# Patient Record
Sex: FEMALE | Race: WHITE | Employment: FULL TIME | URBAN - METROPOLITAN AREA
[De-identification: names, ages, dates, MRNs, and addresses within clinical notes are randomized per-mention and may not be internally consistent; named-entity substitution may affect disease eponyms.]

---

## 2017-02-01 ENCOUNTER — APPOINTMENT (OUTPATIENT)
Dept: LAB | Age: 37
End: 2017-02-01
Attending: NURSE PRACTITIONER
Payer: COMMERCIAL

## 2017-02-01 DIAGNOSIS — R79.89 ELEVATED SERUM CREATININE: ICD-10-CM

## 2017-02-01 DIAGNOSIS — F31.9 BIPOLAR DISORDER, UNSPECIFIED (HCC): ICD-10-CM

## 2017-02-01 DIAGNOSIS — F41.9 ANXIETY: ICD-10-CM

## 2017-02-01 DIAGNOSIS — F43.10 POSTTRAUMATIC STRESS DISORDER: ICD-10-CM

## 2017-02-01 DIAGNOSIS — F98.8 ADD (ATTENTION DEFICIT DISORDER): ICD-10-CM

## 2017-02-01 LAB
BUN BLD-MCNC: 11 MG/DL (ref 8–20)
CHOLEST SMN-MCNC: 174 MG/DL (ref ?–200)
CREAT BLD-MCNC: 1 MG/DL (ref 0.55–1.02)
GLUCOSE BLD-MCNC: 95 MG/DL (ref 70–99)
HDLC SERPL-MCNC: 46 MG/DL (ref 45–?)
HDLC SERPL: 3.78 {RATIO} (ref ?–4.44)
LDLC SERPL CALC-MCNC: 111 MG/DL (ref ?–130)
NONHDLC SERPL-MCNC: 128 MG/DL (ref ?–130)
TRIGLYCERIDES: 83 MG/DL (ref ?–150)
VLDL: 17 MG/DL (ref 5–40)

## 2017-02-01 PROCEDURE — 80061 LIPID PANEL: CPT

## 2017-02-01 PROCEDURE — 84520 ASSAY OF UREA NITROGEN: CPT

## 2017-02-01 PROCEDURE — 82565 ASSAY OF CREATININE: CPT

## 2017-02-01 PROCEDURE — 82947 ASSAY GLUCOSE BLOOD QUANT: CPT

## 2017-02-01 PROCEDURE — 36415 COLL VENOUS BLD VENIPUNCTURE: CPT

## 2017-06-02 PROCEDURE — 81015 MICROSCOPIC EXAM OF URINE: CPT | Performed by: UROLOGY

## 2017-06-02 PROCEDURE — 87086 URINE CULTURE/COLONY COUNT: CPT | Performed by: UROLOGY

## 2017-06-07 PROCEDURE — 82607 VITAMIN B-12: CPT | Performed by: INTERNAL MEDICINE

## 2017-06-07 PROCEDURE — 86665 EPSTEIN-BARR CAPSID VCA: CPT | Performed by: INTERNAL MEDICINE

## 2017-06-07 PROCEDURE — 82746 ASSAY OF FOLIC ACID SERUM: CPT | Performed by: INTERNAL MEDICINE

## 2017-06-07 PROCEDURE — 86141 C-REACTIVE PROTEIN HS: CPT | Performed by: INTERNAL MEDICINE

## 2017-06-07 PROCEDURE — 86644 CMV ANTIBODY: CPT | Performed by: INTERNAL MEDICINE

## 2017-06-07 PROCEDURE — 86645 CMV ANTIBODY IGM: CPT | Performed by: INTERNAL MEDICINE

## 2017-07-20 PROCEDURE — 81001 URINALYSIS AUTO W/SCOPE: CPT | Performed by: INTERNAL MEDICINE

## 2017-11-10 DIAGNOSIS — Z30.09 GENERAL COUNSELING FOR PRESCRIPTION OF ORAL CONTRACEPTIVES: ICD-10-CM

## 2017-11-10 RX ORDER — LEVONORGESTREL AND ETHINYL ESTRADIOL AND ETHINYL ESTRADIOL 150-30(84)
1 KIT ORAL DAILY
Qty: 91 TABLET | Refills: 0 | OUTPATIENT
Start: 2017-11-10 | End: 2018-02-09

## 2017-11-28 ENCOUNTER — TELEPHONE (OUTPATIENT)
Dept: OBGYN CLINIC | Facility: CLINIC | Age: 37
End: 2017-11-28

## 2017-11-28 NOTE — TELEPHONE ENCOUNTER
Received faxed refill request for Amethia tablets 91S. Patient last seen 2/2016; due for annual. Please contact her to schedule appt and then return to RN pool for refill.  Thank you

## 2017-11-30 DIAGNOSIS — Z30.09 GENERAL COUNSELING FOR PRESCRIPTION OF ORAL CONTRACEPTIVES: ICD-10-CM

## 2017-11-30 RX ORDER — LEVONORGESTREL AND ETHINYL ESTRADIOL AND ETHINYL ESTRADIOL 150-30(84)
1 KIT ORAL DAILY
Qty: 91 TABLET | Refills: 0 | OUTPATIENT
Start: 2017-11-30 | End: 2018-03-01

## 2018-02-15 PROBLEM — J30.1 NON-SEASONAL ALLERGIC RHINITIS DUE TO POLLEN: Status: ACTIVE | Noted: 2018-02-15

## 2018-02-15 PROBLEM — R09.81 NASAL CONGESTION: Status: ACTIVE | Noted: 2018-02-15

## 2018-03-27 ENCOUNTER — LAB ENCOUNTER (OUTPATIENT)
Dept: LAB | Age: 38
End: 2018-03-27
Attending: NURSE PRACTITIONER
Payer: COMMERCIAL

## 2018-03-27 DIAGNOSIS — F43.10 POSTTRAUMATIC STRESS DISORDER: ICD-10-CM

## 2018-03-27 DIAGNOSIS — F42.9 OBSESSIVE-COMPULSIVE DISORDER, UNSPECIFIED TYPE: ICD-10-CM

## 2018-03-27 DIAGNOSIS — F90.0 ATTENTION DEFICIT HYPERACTIVITY DISORDER (ADHD), PREDOMINANTLY INATTENTIVE TYPE: ICD-10-CM

## 2018-03-27 LAB
ALBUMIN SERPL BCP-MCNC: 4 G/DL (ref 3.5–4.8)
ALBUMIN/GLOB SERPL: 1.7 {RATIO} (ref 1–2)
ALP SERPL-CCNC: 70 U/L (ref 32–100)
ALT SERPL-CCNC: 51 U/L (ref 14–54)
ANION GAP SERPL CALC-SCNC: 9 MMOL/L (ref 0–18)
AST SERPL-CCNC: 54 U/L (ref 15–41)
BASOPHILS # BLD: 0.1 K/UL (ref 0–0.2)
BASOPHILS NFR BLD: 1 %
BILIRUB SERPL-MCNC: 0.5 MG/DL (ref 0.3–1.2)
BUN SERPL-MCNC: 8 MG/DL (ref 8–20)
BUN/CREAT SERPL: 7.9 (ref 10–20)
CALCIUM SERPL-MCNC: 9.3 MG/DL (ref 8.5–10.5)
CHLORIDE SERPL-SCNC: 103 MMOL/L (ref 95–110)
CO2 SERPL-SCNC: 25 MMOL/L (ref 22–32)
CREAT SERPL-MCNC: 1.01 MG/DL (ref 0.5–1.5)
EOSINOPHIL # BLD: 0.1 K/UL (ref 0–0.7)
EOSINOPHIL NFR BLD: 2 %
ERYTHROCYTE [DISTWIDTH] IN BLOOD BY AUTOMATED COUNT: 12.7 % (ref 11–15)
FOLATE SERPL-MCNC: >24 NG/ML
GLOBULIN PLAS-MCNC: 2.4 G/DL (ref 2.5–3.7)
GLUCOSE SERPL-MCNC: 104 MG/DL (ref 70–99)
HCT VFR BLD AUTO: 38.1 % (ref 35–48)
HGB BLD-MCNC: 13.3 G/DL (ref 12–16)
LYMPHOCYTES # BLD: 1.7 K/UL (ref 1–4)
LYMPHOCYTES NFR BLD: 21 %
MCH RBC QN AUTO: 32.4 PG (ref 27–32)
MCHC RBC AUTO-ENTMCNC: 34.8 G/DL (ref 32–37)
MCV RBC AUTO: 93.2 FL (ref 80–100)
MONOCYTES # BLD: 0.7 K/UL (ref 0–1)
MONOCYTES NFR BLD: 8 %
NEUTROPHILS # BLD AUTO: 5.6 K/UL (ref 1.8–7.7)
NEUTROPHILS NFR BLD: 68 %
OSMOLALITY UR CALC.SUM OF ELEC: 283 MOSM/KG (ref 275–295)
PATIENT FASTING: NO
PLATELET # BLD AUTO: 229 K/UL (ref 140–400)
PMV BLD AUTO: 9.4 FL (ref 7.4–10.3)
POTASSIUM SERPL-SCNC: 4.2 MMOL/L (ref 3.3–5.1)
PROT SERPL-MCNC: 6.4 G/DL (ref 5.9–8.4)
RBC # BLD AUTO: 4.09 M/UL (ref 3.7–5.4)
SODIUM SERPL-SCNC: 137 MMOL/L (ref 136–144)
VIT B12 SERPL-MCNC: 491 PG/ML (ref 181–914)
WBC # BLD AUTO: 8.2 K/UL (ref 4–11)

## 2018-03-27 PROCEDURE — 82607 VITAMIN B-12: CPT

## 2018-03-27 PROCEDURE — 85025 COMPLETE CBC W/AUTO DIFF WBC: CPT

## 2018-03-27 PROCEDURE — 36415 COLL VENOUS BLD VENIPUNCTURE: CPT

## 2018-03-27 PROCEDURE — 82306 VITAMIN D 25 HYDROXY: CPT

## 2018-03-27 PROCEDURE — 80053 COMPREHEN METABOLIC PANEL: CPT

## 2018-03-27 PROCEDURE — 82746 ASSAY OF FOLIC ACID SERUM: CPT

## 2018-03-28 LAB — 25(OH)D3 SERPL-MCNC: 26.4 NG/ML

## 2018-04-18 ENCOUNTER — OFFICE VISIT (OUTPATIENT)
Dept: FAMILY MEDICINE CLINIC | Facility: CLINIC | Age: 38
End: 2018-04-18

## 2018-04-18 VITALS
DIASTOLIC BLOOD PRESSURE: 70 MMHG | RESPIRATION RATE: 16 BRPM | HEART RATE: 84 BPM | BODY MASS INDEX: 30.1 KG/M2 | WEIGHT: 172 LBS | OXYGEN SATURATION: 97 % | TEMPERATURE: 98 F | HEIGHT: 63.5 IN | SYSTOLIC BLOOD PRESSURE: 118 MMHG

## 2018-04-18 DIAGNOSIS — J06.9 UPPER RESPIRATORY TRACT INFECTION, UNSPECIFIED TYPE: ICD-10-CM

## 2018-04-18 DIAGNOSIS — J20.9 ACUTE BRONCHITIS, UNSPECIFIED ORGANISM: Primary | ICD-10-CM

## 2018-04-18 PROCEDURE — 99213 OFFICE O/P EST LOW 20 MIN: CPT | Performed by: PHYSICIAN ASSISTANT

## 2018-04-18 RX ORDER — METHYLPREDNISOLONE 4 MG/1
TABLET ORAL
Qty: 1 PACKAGE | Refills: 0 | Status: ON HOLD | OUTPATIENT
Start: 2018-04-18 | End: 2018-08-21

## 2018-04-18 RX ORDER — BENZONATATE 200 MG/1
200 CAPSULE ORAL 3 TIMES DAILY PRN
Qty: 21 CAPSULE | Refills: 0 | Status: SHIPPED | OUTPATIENT
Start: 2018-04-18 | End: 2018-04-25

## 2018-04-18 RX ORDER — ALBUTEROL SULFATE 90 UG/1
AEROSOL, METERED RESPIRATORY (INHALATION)
Qty: 1 INHALER | Refills: 0 | Status: ON HOLD | OUTPATIENT
Start: 2018-04-18 | End: 2018-08-21

## 2018-04-18 RX ORDER — AZITHROMYCIN 250 MG/1
TABLET, FILM COATED ORAL
Qty: 6 TABLET | Refills: 0 | Status: ON HOLD | OUTPATIENT
Start: 2018-04-18 | End: 2018-08-21

## 2018-04-18 NOTE — PROGRESS NOTES
CHIEF COMPLAINT:   Patient presents with:  Cough: x 3 days, body aches, dark yellow mucus,         HPI:   Sunday Yolanda is a 44year old female who presents for cough for  3  days. Cough is productive and the patient is getting into coughing fits.  Ass Azelastine HCl 137 MCG/SPRAY Nasal Solution 2 sprays by Nasal route 2 (two) times daily. Disp: 3 Bottle Rfl: 3   Solifenacin Succinate (VESICARE) 5 MG Oral Tab Take 1 tablet (5 mg total) by mouth daily.  Disp: 90 tablet Rfl: 3   acetaminophen (TYLENOL) 325 EXAM:   /70   Pulse 84   Temp 98.3 °F (36.8 °C) (Oral)   Resp 16   Ht 63.5\"   Wt 172 lb   SpO2 97%   BMI 29.99 kg/m²   GENERAL: well developed, well nourished,in no apparent distress  SKIN: no rashes, no suspicious lesions  EYES: EOMI. PERRL.   Conju 5. Follow up with PCP  Acute Bronchitis  Your healthcare provider has told you that you have acute bronchitis. Bronchitis is infection or inflammation of the bronchial tubes (airways in the lungs). Normally, air moves easily in and out of the airways.  Colleen Barron · Drink plenty of fluids, such as water, juice, or warm soup. Fluids loosen mucus so that you can cough it up. This helps you breathe more easily. Fluids also prevent dehydration. · Make sure you get plenty of rest.  · Do not smoke.  Do not allow anyone el

## 2018-04-18 NOTE — PATIENT INSTRUCTIONS
1. Medrol- No NSAIDs  2. Albuterol  3. Tessalon for cough  4. If no improvement, fever or worsening symptoms start z desmond  5. Follow up with PCP  Acute Bronchitis  Your healthcare provider has told you that you have acute bronchitis.  Bronchitis is infecti · Take medicine as directed. You may be told to take ibuprofen or other over-the-counter medicines. These help relieve inflammation in your bronchial tubes. Your healthcare provider may prescribe an inhaler to help open up the bronchial tubes.  Most of the

## 2018-04-19 ENCOUNTER — TELEPHONE (OUTPATIENT)
Dept: FAMILY MEDICINE CLINIC | Facility: CLINIC | Age: 38
End: 2018-04-19

## 2018-04-19 NOTE — TELEPHONE ENCOUNTER
Patient called requesting a work note be faxed to her employer for today and yesterday. Pt reports she is still feeling ill with cough. Denies SOB or fever. Will fax work note to her employer. Advised pt follow up if feeling any worse.

## 2018-07-23 ENCOUNTER — APPOINTMENT (OUTPATIENT)
Dept: LAB | Age: 38
End: 2018-07-23
Attending: NURSE PRACTITIONER
Payer: COMMERCIAL

## 2018-07-23 DIAGNOSIS — F90.0 ATTENTION DEFICIT HYPERACTIVITY DISORDER (ADHD), PREDOMINANTLY INATTENTIVE TYPE: ICD-10-CM

## 2018-07-23 DIAGNOSIS — F43.10 POSTTRAUMATIC STRESS DISORDER: ICD-10-CM

## 2018-07-23 DIAGNOSIS — F31.61 BIPOLAR DISORDER, CURRENT EPISODE MIXED, MILD (HCC): ICD-10-CM

## 2018-07-23 DIAGNOSIS — F42.9 OBSESSIVE-COMPULSIVE DISORDER, UNSPECIFIED TYPE: ICD-10-CM

## 2018-07-23 LAB
CHOLEST SMN-MCNC: 201 MG/DL (ref ?–200)
GLUCOSE BLD-MCNC: 85 MG/DL (ref 70–99)
HDLC SERPL-MCNC: 54 MG/DL (ref 40–59)
HDLC SERPL: 3.72 {RATIO} (ref ?–4.44)
LDLC SERPL CALC-MCNC: 126 MG/DL (ref ?–100)
NONHDLC SERPL-MCNC: 147 MG/DL (ref ?–130)
TRIGL SERPL-MCNC: 103 MG/DL (ref 30–149)
VLDLC SERPL CALC-MCNC: 21 MG/DL (ref 0–30)

## 2018-07-23 PROCEDURE — 36415 COLL VENOUS BLD VENIPUNCTURE: CPT

## 2018-07-23 PROCEDURE — 80175 DRUG SCREEN QUAN LAMOTRIGINE: CPT

## 2018-07-23 PROCEDURE — 82947 ASSAY GLUCOSE BLOOD QUANT: CPT

## 2018-07-23 PROCEDURE — 80061 LIPID PANEL: CPT

## 2018-07-24 LAB — LAMOTRIGINE: 5.2 UG/ML

## 2018-07-27 PROCEDURE — 81015 MICROSCOPIC EXAM OF URINE: CPT | Performed by: UROLOGY

## 2018-11-04 ENCOUNTER — APPOINTMENT (OUTPATIENT)
Dept: GENERAL RADIOLOGY | Age: 38
End: 2018-11-04
Attending: EMERGENCY MEDICINE
Payer: COMMERCIAL

## 2018-11-04 ENCOUNTER — HOSPITAL ENCOUNTER (EMERGENCY)
Age: 38
Discharge: HOME | End: 2018-11-04
Attending: EMERGENCY MEDICINE
Payer: COMMERCIAL

## 2018-11-04 ENCOUNTER — OFFICE VISIT (OUTPATIENT)
Dept: FAMILY MEDICINE CLINIC | Facility: CLINIC | Age: 38
End: 2018-11-04
Payer: COMMERCIAL

## 2018-11-04 VITALS
OXYGEN SATURATION: 98 % | WEIGHT: 168.81 LBS | DIASTOLIC BLOOD PRESSURE: 86 MMHG | HEART RATE: 71 BPM | TEMPERATURE: 98 F | BODY MASS INDEX: 28.82 KG/M2 | SYSTOLIC BLOOD PRESSURE: 120 MMHG | HEIGHT: 64 IN

## 2018-11-04 VITALS
RESPIRATION RATE: 16 BRPM | HEART RATE: 68 BPM | BODY MASS INDEX: 28.68 KG/M2 | OXYGEN SATURATION: 98 % | DIASTOLIC BLOOD PRESSURE: 82 MMHG | TEMPERATURE: 99 F | WEIGHT: 168 LBS | HEIGHT: 64 IN | SYSTOLIC BLOOD PRESSURE: 132 MMHG

## 2018-11-04 DIAGNOSIS — K59.00 CONSTIPATION, UNSPECIFIED CONSTIPATION TYPE: Primary | ICD-10-CM

## 2018-11-04 DIAGNOSIS — Z02.9 ENCOUNTERS FOR ADMINISTRATIVE PURPOSE: Primary | ICD-10-CM

## 2018-11-04 PROCEDURE — 99283 EMERGENCY DEPT VISIT LOW MDM: CPT

## 2018-11-04 PROCEDURE — 74018 RADEX ABDOMEN 1 VIEW: CPT | Performed by: EMERGENCY MEDICINE

## 2018-11-04 RX ORDER — POLYETHYLENE GLYCOL 3350 17 G/17G
17 POWDER, FOR SOLUTION ORAL DAILY PRN
Qty: 12 EACH | Refills: 0 | Status: SHIPPED | OUTPATIENT
Start: 2018-11-04 | End: 2018-11-04

## 2018-11-04 RX ORDER — BISACODYL 10 MG
10 SUPPOSITORY, RECTAL RECTAL ONCE
Status: COMPLETED | OUTPATIENT
Start: 2018-11-04 | End: 2018-11-04

## 2018-11-04 NOTE — ED PROVIDER NOTES
Patient Seen in: [facility] In [place]    History   Patient presents with:  Constipation (gastrointestinal)  Abdomen/Flank Pain (GI/)  Nausea/Vomiting/Diarrhea (gastrointestinal)    Stated Complaint: CONSTIPATION FOR MONTH SENT FROM EPIDURAL - LUMBAR;  Surgeon: Kyara English MD;  Location: [facility] FOR PAIN MANAGEMENT   • M-SEDAJ BY [facility] [id number] [address] [facility] [address] [facility] 5[facility] YR  4/25/2014    Procedure: TRANSFORAMINAL EPIDURAL - LUMBAR;  Surgeon: Kyara English MD;  Location: [address] [place] [address] moving all 4 extremities well. ED Course     Labs Reviewed   UA HOLD          Xr Abdomen (1 View) (cpt=74018)    Result Date: 11/4/2018  CONCLUSION:  Large amount of stool in the descending and rectosigmoid colon concerning for constipation.     Dictated

## 2018-11-04 NOTE — PROGRESS NOTES
Patient presents with right sided abdominal pain x2 days- 6/10. Hx of constipation x1 month, dark stool, and bloating. Today nausea and decreased appetite. Tried prune juice and milk of magnesia which caused diarrhea- 12BM's in last 24hrs.   Vitals stabl

## 2018-11-04 NOTE — ED INITIAL ASSESSMENT (HPI)
Patient c/o constipation x 1 month. +nausea and generalized abdominal pain. +bloating. Tried stool softeners, miralax, prune juice, milk of magnesium. Took Prune juice and MOM caused 12 episodes of diarrhea yesterday.

## 2018-11-11 ENCOUNTER — APPOINTMENT (OUTPATIENT)
Dept: CT IMAGING | Facility: HOSPITAL | Age: 38
End: 2018-11-11
Attending: EMERGENCY MEDICINE
Payer: COMMERCIAL

## 2018-11-11 ENCOUNTER — HOSPITAL ENCOUNTER (EMERGENCY)
Facility: HOSPITAL | Age: 38
Discharge: HOME | End: 2018-11-11
Attending: EMERGENCY MEDICINE
Payer: COMMERCIAL

## 2018-11-11 VITALS
SYSTOLIC BLOOD PRESSURE: 123 MMHG | DIASTOLIC BLOOD PRESSURE: 84 MMHG | RESPIRATION RATE: 18 BRPM | HEIGHT: 64 IN | TEMPERATURE: 97 F | OXYGEN SATURATION: 97 % | BODY MASS INDEX: 27.66 KG/M2 | WEIGHT: 162 LBS | HEART RATE: 72 BPM

## 2018-11-11 DIAGNOSIS — K59.00 CONSTIPATION, UNSPECIFIED CONSTIPATION TYPE: Primary | ICD-10-CM

## 2018-11-11 PROCEDURE — 74176 CT ABD & PELVIS W/O CONTRAST: CPT | Performed by: EMERGENCY MEDICINE

## 2018-11-11 PROCEDURE — 99284 EMERGENCY DEPT VISIT MOD MDM: CPT

## 2018-11-11 PROCEDURE — 81025 URINE PREGNANCY TEST: CPT

## 2018-11-11 RX ORDER — POLYETHYLENE GLYCOL 3350 17 G/17G
17 POWDER, FOR SOLUTION ORAL DAILY PRN
Qty: 12 EACH | Refills: 0 | Status: SHIPPED | OUTPATIENT
Start: 2018-11-11 | End: 2018-12-11

## 2018-11-11 NOTE — ED INITIAL ASSESSMENT (HPI)
Pt w/ constipation x 2 months. Pt on psych meds but no change in a long time. Pt has taken every medication to help w/ constipation and nothing is working. Pain is located to upper abd. Last BM, unsure.

## 2018-11-11 NOTE — ED NOTES
Recommended pt go on a gluten free diet and check w/ Urologist about anticholinergic med. Pt is having dry mouth and constipation. Per wife, dentist noticed dental changes last visit.

## 2018-11-11 NOTE — ED PROVIDER NOTES
Patient Seen in: [facility] Emergency Department    History   Patient presents with:  Constipation (gastrointestinal)    Stated Complaint: Constipation x 1 month.   OTC remedies not helping    HPI    26-year-old with a history of ADHD, anxiety, bipol was panic attacks and SOB--PFT's see media-is WNL, off Asthma meds since early 2011   • PTSD (post-traumatic stress disorder)        Past Surgical History:   Procedure Laterality Date   • DEBORAH OROZCO & Iman Kebede NDL/MARVIN JETER DX/[facility]  4/25/2014    Procedure: no acute distress. HEENT:  Sclera are not icteric. Conjunctivae within normal limits. Mucous members are moist.   Cardiovascular: Regular rate and rhythm, normal S1-S2. Respiratory: Lungs are clear to auscultation bilaterally.    Abdomen: Soft, nontend Total Protein 6.1 - 8.3 g/dL 7.1    Albumin 3.5 - 4.8 g/dL 3.7    Bilirubin, Total 0.10 - 2.00 mg/dL 0.20    Alkaline Phosphatase 37 - 98 U/L 97    AST 15 - 41 U/L 20    ALT 14 - 54 U/L 31    GFR CKD-EPI >=60.00 mL/min/1.73 m² 82.48     TSH 0.350 - 5.500 image 80..         =====  CONCLUSION:       1. Bilateral L5 pars defect, fairly wide gap 10 mm.   Although no subluxation at this time, advise obtaining spine surgery consultation for further evaluation and potential management; many patients can go on to AM    START taking these medications    bisacodyl 5 MG Oral Tab EC  Take 1 tablet (5 mg total) by mouth daily as needed for constipation. , Normal, Disp-30 tablet, R-0    PEG 3350 Oral Powd Pack  Take 17 g by mouth daily as needed. , Print Script, Disp-12 ea

## 2018-11-21 ENCOUNTER — OFFICE VISIT (OUTPATIENT)
Dept: FAMILY MEDICINE CLINIC | Facility: CLINIC | Age: 38
End: 2018-11-21
Payer: COMMERCIAL

## 2018-11-21 VITALS
OXYGEN SATURATION: 97 % | WEIGHT: 167 LBS | DIASTOLIC BLOOD PRESSURE: 72 MMHG | HEART RATE: 94 BPM | BODY MASS INDEX: 29.22 KG/M2 | RESPIRATION RATE: 17 BRPM | TEMPERATURE: 98 F | SYSTOLIC BLOOD PRESSURE: 110 MMHG | HEIGHT: 63.5 IN

## 2018-11-21 DIAGNOSIS — Z00.00 ENCOUNTER FOR ANNUAL PHYSICAL EXAMINATION EXCLUDING GYNECOLOGICAL EXAMINATION IN A PATIENT OLDER THAN 17 YEARS: Primary | ICD-10-CM

## 2018-11-21 DIAGNOSIS — K59.00 CONSTIPATION, UNSPECIFIED CONSTIPATION TYPE: ICD-10-CM

## 2018-11-21 DIAGNOSIS — F33.2 SEVERE EPISODE OF RECURRENT MAJOR DEPRESSIVE DISORDER, WITHOUT PSYCHOTIC FEATURES (HCC): ICD-10-CM

## 2018-11-21 DIAGNOSIS — F41.1 GENERALIZED ANXIETY DISORDER: ICD-10-CM

## 2018-11-21 DIAGNOSIS — Z23 NEED FOR VACCINATION: ICD-10-CM

## 2018-11-21 PROBLEM — R09.81 NASAL CONGESTION: Status: RESOLVED | Noted: 2018-02-15 | Resolved: 2018-11-21

## 2018-11-21 PROBLEM — Z91.51 HISTORY OF SUICIDE ATTEMPT: Status: ACTIVE | Noted: 2018-11-21

## 2018-11-21 PROCEDURE — 99395 PREV VISIT EST AGE 18-39: CPT | Performed by: EMERGENCY MEDICINE

## 2018-11-21 PROCEDURE — 90686 IIV4 VACC NO PRSV 0.5 ML IM: CPT | Performed by: EMERGENCY MEDICINE

## 2018-11-21 PROCEDURE — 90471 IMMUNIZATION ADMIN: CPT | Performed by: EMERGENCY MEDICINE

## 2018-11-21 NOTE — PATIENT INSTRUCTIONS
Thank you for choosing [facility]  To Do:  FOR LATASHA PICKERING    · Relaxation techniques, deep breathing and meditation  · May try peppermint oil  · Continue follow up with Dr. Nicolasa Leiva and Psychiatry  · Continue all other meds            Well b you're at risk or have symptoms   Depression All women in this age group At routine exams   Diabetes mellitus, type 2 Adults with no symptoms who are overweight or obese and have 1 or more other risk factors for diabetes At least every 3 years   Gonorrhea increased risk for infection – talk with your healthcare provider 1 or more doses   Pneumococcal conjugate vaccine (PCV13) and pneumococcal polysaccharide vaccine (PPSV23) Women at increased risk for infection – talk with your healthcare provider PCV13: 1 information is not intended as a substitute for professional medical care. Always follow your healthcare professional's instructions.

## 2018-11-21 NOTE — PROGRESS NOTES
Lisa Hernandez is a 44year old female who presents for a complete physical exam.   HPI:     Patient presents with:  Physical: Annual physical, c/o constipation and bloating         Age: 44    1First day of last menstrual period (or first year of 2010       f. Does your house have a working smoke detector? YES        g. Do you have firearms at home? NO        h. Have you ever had a mammogram?  NO     If yes, date of last mammogram:     j. When is the last time you had           a dental check-up? Cap Take 2 capsules at night Disp: 270 capsule Rfl: 0   Solifenacin Succinate (VESICARE) 5 MG Oral Tab Take 1 tablet (5 mg total) by mouth daily.  Disp: 30 tablet Rfl: 12   gabapentin 800 MG Oral Tab Take 1 tablet (800 mg total) by mouth 3 (three) times kei Father 61        Melanoma   • Other (Other) Father    • Diabetes Mother    • Hypertension Mother    • Cancer Brother         testicular cancer   • Bipolar Disorder Brother    • Other (Other) Brother       Social History    Tobacco Use      Smoking status: Depression    • Diarrhea, unspecified    • Dysmenorrhea    • Fatigue    • Feeling lonely    • Fibroids    • Frequent use of laxatives    • History of depression    • History of mental disorder    • Irregular bowel habits    • Left ovarian cyst    • Loss of sore throat; hearing loss negative,   RESPIRATORY: denies shortness of breath, wheezing or cough   CARDIOVASCULAR: denies chest pain, SOB, edema,orthopnea, no palpitations   GI: denies nausea, vomiting, constipation, diarrhea; no rectal bleeding; no heartb patient should schedule annual mammograms beginning at the age of 36. Counseled on fat diet and aerobic exercise 30 minutes three times weekly. Health maintenance.    Immunizations reviewed and updated  The patient indicates understanding of these issues

## 2018-11-24 ENCOUNTER — MED REC SCAN ONLY (OUTPATIENT)
Dept: FAMILY MEDICINE CLINIC | Facility: CLINIC | Age: 38
End: 2018-11-24

## 2019-01-06 ENCOUNTER — OFFICE VISIT (OUTPATIENT)
Dept: FAMILY MEDICINE CLINIC | Facility: CLINIC | Age: 39
End: 2019-01-06
Payer: COMMERCIAL

## 2019-01-06 VITALS
HEIGHT: 64 IN | TEMPERATURE: 98 F | OXYGEN SATURATION: 98 % | DIASTOLIC BLOOD PRESSURE: 62 MMHG | BODY MASS INDEX: 27.49 KG/M2 | SYSTOLIC BLOOD PRESSURE: 100 MMHG | HEART RATE: 99 BPM | WEIGHT: 161 LBS

## 2019-01-06 DIAGNOSIS — J20.9 ACUTE BRONCHITIS, UNSPECIFIED ORGANISM: Primary | ICD-10-CM

## 2019-01-06 PROCEDURE — 99213 OFFICE O/P EST LOW 20 MIN: CPT | Performed by: NURSE PRACTITIONER

## 2019-01-06 RX ORDER — BENZONATATE 200 MG/1
200 CAPSULE ORAL 3 TIMES DAILY PRN
Qty: 30 CAPSULE | Refills: 0 | Status: SHIPPED | OUTPATIENT
Start: 2019-01-06 | End: 2019-04-08 | Stop reason: ALTCHOICE

## 2019-01-06 RX ORDER — PREDNISONE 20 MG/1
20 TABLET ORAL DAILY
Qty: 5 TABLET | Refills: 0 | Status: SHIPPED | OUTPATIENT
Start: 2019-01-06 | End: 2019-01-11

## 2019-01-06 NOTE — PROGRESS NOTES
CHIEF COMPLAINT:   Patient presents with:  Cough: cough since november, worsening over 2 days. Chest pain with coughing, sneezing X2 days, bilateral ear popping x 2days, chills x2days, upset stomach . dx with bronchitis in november. No NV fever.         HPI Prazosin HCl 1 MG Oral Cap Take 2 capsules at night Disp: 270 capsule Rfl: 0   Solifenacin Succinate (VESICARE) 5 MG Oral Tab Take 1 tablet (5 mg total) by mouth daily.  Disp: 30 tablet Rfl: 12   gabapentin 800 MG Oral Tab Take 1 tablet (800 mg total) by mo HENT: Denies ear pain, decreased hearing, or sore throat. Reports rhinitis. CARDIOVASCULAR: Denies chest pain or palpitations  LUNGS: Per HPI. GI: Denies N/V/C/D or abdominal pain.       EXAM:   /62 (BP Location: Right arm, Patient Position: Sitti You have a viral bronchitis. Bronchitis is inflammation and swelling of the lining of the lungs. This is often caused by an infection. Symptoms include a dry, hacking cough that is worse at night. The cough may bring up yellow-green mucus.  You may also fee · Over-the-counter cough, cold, and sore-throat medicines will not shorten the length of the illness, but they may help to reduce symptoms. Don't use decongestants if you have high blood pressure.   Follow-up care  Follow up with your healthcare provider, o

## 2019-08-06 ENCOUNTER — OFFICE VISIT (OUTPATIENT)
Dept: FAMILY MEDICINE CLINIC | Facility: CLINIC | Age: 39
End: 2019-08-06
Payer: COMMERCIAL

## 2019-08-06 VITALS
HEART RATE: 76 BPM | HEIGHT: 64 IN | SYSTOLIC BLOOD PRESSURE: 90 MMHG | OXYGEN SATURATION: 95 % | WEIGHT: 133.81 LBS | RESPIRATION RATE: 16 BRPM | TEMPERATURE: 98 F | DIASTOLIC BLOOD PRESSURE: 58 MMHG | BODY MASS INDEX: 22.85 KG/M2

## 2019-08-06 DIAGNOSIS — J40 BRONCHITIS: Primary | ICD-10-CM

## 2019-08-06 PROCEDURE — 99213 OFFICE O/P EST LOW 20 MIN: CPT | Performed by: NURSE PRACTITIONER

## 2019-08-06 PROCEDURE — 94640 AIRWAY INHALATION TREATMENT: CPT | Performed by: NURSE PRACTITIONER

## 2019-08-06 RX ORDER — ALBUTEROL SULFATE 90 UG/1
2 AEROSOL, METERED RESPIRATORY (INHALATION) EVERY 6 HOURS PRN
Qty: 1 INHALER | Refills: 0 | Status: SHIPPED | OUTPATIENT
Start: 2019-08-06 | End: 2021-07-27

## 2019-08-06 RX ORDER — PREDNISONE 20 MG/1
40 TABLET ORAL DAILY
Qty: 10 TABLET | Refills: 0 | Status: SHIPPED | OUTPATIENT
Start: 2019-08-06 | End: 2019-08-11

## 2019-08-06 RX ORDER — IPRATROPIUM BROMIDE AND ALBUTEROL SULFATE 2.5; .5 MG/3ML; MG/3ML
3 SOLUTION RESPIRATORY (INHALATION) ONCE
Status: COMPLETED | OUTPATIENT
Start: 2019-08-06 | End: 2019-08-06

## 2019-08-06 RX ORDER — BENZONATATE 200 MG/1
200 CAPSULE ORAL 3 TIMES DAILY PRN
Qty: 30 CAPSULE | Refills: 0 | Status: SHIPPED | OUTPATIENT
Start: 2019-08-06 | End: 2019-09-20 | Stop reason: ALTCHOICE

## 2019-08-06 RX ADMIN — IPRATROPIUM BROMIDE AND ALBUTEROL SULFATE 3 ML: 2.5; .5 SOLUTION RESPIRATORY (INHALATION) at 11:54:00

## 2019-08-06 NOTE — PATIENT INSTRUCTIONS
Viral or Bacterial Bronchitis with Wheezing (Adult)    Bronchitis is an infection of the air passages. It often occurs during a cold and is usually caused by a virus. Symptoms include cough with mucus (phlegm) and low-grade fever.  This illness is contagi · Over-the-counter cough, cold, and sore-throat medicines will not shorten the length of the illness, but they may be helpful to reduce symptoms.  (Note: Don't use decongestants if you have high blood pressure.)  · If you were given an inhaler, use it exact

## 2019-08-06 NOTE — PROGRESS NOTES
CHIEF COMPLAINT:   Patient presents with:  Cough: mucus, nasal congestion, both ear pain  x 2 days         HPI:   Milena Washington is a 43year old female who presents for cough for  2  days. Cough started gradually and is described as continuous.  Abner busPIRone HCl 30 MG Oral Tab Take 1 tablet (30 mg total) by mouth 3 (three) times daily. Disp: 90 tablet Rfl: 0   ALPRAZolam ER (ALPRAZOLAM XR) 2 MG Oral Tablet 24 Hr Take 1 tablet (2 mg total) by mouth every morning.  Disp: 30 tablet Rfl: 0   ALPRAZolam 1 • OCD (obsessive compulsive disorder)     OCD--started on medication and referral to therapist   • Panic attacks     thought to have asthma--but it was panic attacks and SOB--PFT's see media-is WNL, off Asthma meds since early 2011   • Problems with john paulallo PLAN:  Duoneb in office with improvement. 02 sats increased to 98% on RA. Will send patient home on 5 day course of prednisone with benzonatate and albuterol. Supportive care. To follow up with PCP in 1-2 days if symptoms worsen.   Rest, increase water i Bronchitis is an infection of the air passages. It often occurs during a cold and is usually caused by a virus. Symptoms include cough with mucus (phlegm) and low-grade fever.  This illness is contagious during the first few days and is spread through the a · Over-the-counter cough, cold, and sore-throat medicines will not shorten the length of the illness, but they may be helpful to reduce symptoms.  (Note: Don't use decongestants if you have high blood pressure.)  · If you were given an inhaler, use it exact

## 2019-08-29 NOTE — TELEPHONE ENCOUNTER
Medication(s) to Refill:   Requested Prescriptions     Pending Prescriptions Disp Refills   • Albuterol Sulfate HFA (PROAIR HFA) 108 (90 Base) MCG/ACT Inhalation Aero Soln 8.5 g 2     Sig: INHALE 2 PUFFS INTO THE LUNGS EVERY 4 HOURS AS NEEDED FOR WHEEZING

## 2019-08-30 RX ORDER — ALBUTEROL SULFATE 90 UG/1
AEROSOL, METERED RESPIRATORY (INHALATION)
Qty: 8.5 G | Refills: 2 | Status: SHIPPED | OUTPATIENT
Start: 2019-08-30 | End: 2021-07-27

## 2019-09-20 ENCOUNTER — OFFICE VISIT (OUTPATIENT)
Dept: FAMILY MEDICINE CLINIC | Facility: CLINIC | Age: 39
End: 2019-09-20
Payer: COMMERCIAL

## 2019-09-20 VITALS
HEART RATE: 79 BPM | WEIGHT: 128.81 LBS | TEMPERATURE: 99 F | HEIGHT: 64 IN | SYSTOLIC BLOOD PRESSURE: 104 MMHG | BODY MASS INDEX: 21.99 KG/M2 | DIASTOLIC BLOOD PRESSURE: 58 MMHG | OXYGEN SATURATION: 97 % | RESPIRATION RATE: 18 BRPM

## 2019-09-20 DIAGNOSIS — J06.9 URI WITH COUGH AND CONGESTION: Primary | ICD-10-CM

## 2019-09-20 PROCEDURE — 99213 OFFICE O/P EST LOW 20 MIN: CPT | Performed by: NURSE PRACTITIONER

## 2019-09-20 RX ORDER — BENZONATATE 200 MG/1
200 CAPSULE ORAL 3 TIMES DAILY PRN
Qty: 20 CAPSULE | Refills: 0 | Status: SHIPPED | OUTPATIENT
Start: 2019-09-20 | End: 2019-09-27

## 2019-09-20 NOTE — PROGRESS NOTES
CHIEF COMPLAINT:   Patient presents with:  Cough: flem, nasal congestion, headache, ear pain x 2 days      HPI:   Darrel Cowart is a 43year old female who presents for upper respiratory symptoms for  2 days.  Patient reports congestion, cough with yell Albuterol Sulfate  (90 Base) MCG/ACT Inhalation Aero Soln Inhale 2 puffs into the lungs every 6 (six) hours as needed for Wheezing. Disp: 1 Inhaler Rfl: 0   traZODone HCl 300 MG Oral Tab Take one tablet at bedtime.  Disp: 30 tablet Rfl: 0   Solifenac Alcohol/week: 0.0 standard drinks      Comment: socially 2xper month a drink- rare    Drug use: No        REVIEW OF SYSTEMS:   GENERAL: no change in appetite  SKIN: no rashes or abnormal skin lesions  HEENT: See HPI  LUNGS: See HPI  CARDIOVASCULAR: den • benzonatate 200 MG Oral Cap 20 capsule 0     Sig: Take 1 capsule (200 mg total) by mouth 3 (three) times daily as needed. Risks, benefits, and side effects of medication explained and discussed.     The patient indicates understanding of these issues · Your appetite may be poor, so a light diet is fine. Stay well hydrated by drinking 6 to 8 glasses of fluids per day (water, soft drinks, juices, tea, or soup). Extra fluids will help loosen secretions in the nose and lungs.   · Over-the-counter cold medic

## 2019-09-20 NOTE — PATIENT INSTRUCTIONS
Humidifier in room  Sleep propped  Push fluids  Limit dairy  Mucinex as directed  Benadryl at night as needed  Use inhaler as needed for wheezing or coughing fits    Viral Upper Respiratory Illness (Adult)    You have a viral upper respiratory illness (U · Over-the-counter cold medicines will not shorten the length of time you’re sick, but they may be helpful for the following symptoms: cough, sore throat, and nasal and sinus congestion.  If you take prescription medicines, ask your healthcare provider or p

## 2020-07-02 ENCOUNTER — LAB ENCOUNTER (OUTPATIENT)
Dept: LAB | Age: 40
End: 2020-07-02
Attending: NURSE PRACTITIONER
Payer: COMMERCIAL

## 2020-07-02 DIAGNOSIS — F42.9 OBSESSIVE-COMPULSIVE DISORDER, UNSPECIFIED TYPE: ICD-10-CM

## 2020-07-02 DIAGNOSIS — F31.61 BIPOLAR DISORDER, CURRENT EPISODE MIXED, MILD (HCC): ICD-10-CM

## 2020-07-02 DIAGNOSIS — F90.0 ATTENTION DEFICIT HYPERACTIVITY DISORDER (ADHD), PREDOMINANTLY INATTENTIVE TYPE: ICD-10-CM

## 2020-07-02 DIAGNOSIS — F41.9 ANXIETY: ICD-10-CM

## 2020-07-02 DIAGNOSIS — F43.10 POSTTRAUMATIC STRESS DISORDER: ICD-10-CM

## 2020-07-02 LAB
ALBUMIN SERPL-MCNC: 3.6 G/DL (ref 3.4–5)
ALBUMIN/GLOB SERPL: 1.1 {RATIO} (ref 1–2)
ALP LIVER SERPL-CCNC: 67 U/L (ref 37–98)
ALT SERPL-CCNC: 24 U/L (ref 13–56)
ANION GAP SERPL CALC-SCNC: 5 MMOL/L (ref 0–18)
AST SERPL-CCNC: 21 U/L (ref 15–37)
BASOPHILS # BLD AUTO: 0.02 X10(3) UL (ref 0–0.2)
BASOPHILS NFR BLD AUTO: 0.4 %
BILIRUB SERPL-MCNC: 0.5 MG/DL (ref 0.1–2)
BUN BLD-MCNC: 14 MG/DL (ref 7–18)
BUN/CREAT SERPL: 14.4 (ref 10–20)
CALCIUM BLD-MCNC: 9.2 MG/DL (ref 8.5–10.1)
CHLORIDE SERPL-SCNC: 108 MMOL/L (ref 98–112)
CHOLEST SMN-MCNC: 165 MG/DL (ref ?–200)
CO2 SERPL-SCNC: 26 MMOL/L (ref 21–32)
CREAT BLD-MCNC: 0.97 MG/DL (ref 0.55–1.02)
DEPRECATED RDW RBC AUTO: 45.7 FL (ref 35.1–46.3)
EOSINOPHIL # BLD AUTO: 0.14 X10(3) UL (ref 0–0.7)
EOSINOPHIL NFR BLD AUTO: 2.8 %
ERYTHROCYTE [DISTWIDTH] IN BLOOD BY AUTOMATED COUNT: 13.2 % (ref 11–15)
FOLATE SERPL-MCNC: 19.4 NG/ML (ref 8.7–?)
GLOBULIN PLAS-MCNC: 3.4 G/DL (ref 2.8–4.4)
GLUCOSE BLD-MCNC: 77 MG/DL (ref 70–99)
HCT VFR BLD AUTO: 37.4 % (ref 35–48)
HDLC SERPL-MCNC: 57 MG/DL (ref 40–59)
HGB BLD-MCNC: 12.2 G/DL (ref 12–16)
IMM GRANULOCYTES # BLD AUTO: 0.01 X10(3) UL (ref 0–1)
IMM GRANULOCYTES NFR BLD: 0.2 %
LDLC SERPL CALC-MCNC: 89 MG/DL (ref ?–100)
LYMPHOCYTES # BLD AUTO: 1.58 X10(3) UL (ref 1–4)
LYMPHOCYTES NFR BLD AUTO: 32.1 %
M PROTEIN MFR SERPL ELPH: 7 G/DL (ref 6.4–8.2)
MCH RBC QN AUTO: 30.9 PG (ref 26–34)
MCHC RBC AUTO-ENTMCNC: 32.6 G/DL (ref 31–37)
MCV RBC AUTO: 94.7 FL (ref 80–100)
MONOCYTES # BLD AUTO: 0.4 X10(3) UL (ref 0.1–1)
MONOCYTES NFR BLD AUTO: 8.1 %
NEUTROPHILS # BLD AUTO: 2.77 X10 (3) UL (ref 1.5–7.7)
NEUTROPHILS # BLD AUTO: 2.77 X10(3) UL (ref 1.5–7.7)
NEUTROPHILS NFR BLD AUTO: 56.4 %
NONHDLC SERPL-MCNC: 108 MG/DL (ref ?–130)
OSMOLALITY SERPL CALC.SUM OF ELEC: 287 MOSM/KG (ref 275–295)
PATIENT FASTING Y/N/NP: YES
PATIENT FASTING Y/N/NP: YES
PLATELET # BLD AUTO: 200 10(3)UL (ref 150–450)
POTASSIUM SERPL-SCNC: 4.1 MMOL/L (ref 3.5–5.1)
RBC # BLD AUTO: 3.95 X10(6)UL (ref 3.8–5.3)
SODIUM SERPL-SCNC: 139 MMOL/L (ref 136–145)
T4 FREE SERPL-MCNC: 0.8 NG/DL (ref 0.8–1.7)
TRIGL SERPL-MCNC: 94 MG/DL (ref 30–149)
TSI SER-ACNC: 0.94 MIU/ML (ref 0.36–3.74)
VIT B12 SERPL-MCNC: 508 PG/ML (ref 193–986)
VIT D+METAB SERPL-MCNC: 41.7 NG/ML (ref 30–100)
VLDLC SERPL CALC-MCNC: 19 MG/DL (ref 0–30)
WBC # BLD AUTO: 4.9 X10(3) UL (ref 4–11)

## 2020-07-02 PROCEDURE — 82746 ASSAY OF FOLIC ACID SERUM: CPT

## 2020-07-02 PROCEDURE — 82306 VITAMIN D 25 HYDROXY: CPT

## 2020-07-02 PROCEDURE — 84439 ASSAY OF FREE THYROXINE: CPT

## 2020-07-02 PROCEDURE — 36415 COLL VENOUS BLD VENIPUNCTURE: CPT

## 2020-07-02 PROCEDURE — 80061 LIPID PANEL: CPT

## 2020-07-02 PROCEDURE — 82607 VITAMIN B-12: CPT

## 2020-07-02 PROCEDURE — 85025 COMPLETE CBC W/AUTO DIFF WBC: CPT

## 2020-07-02 PROCEDURE — 84443 ASSAY THYROID STIM HORMONE: CPT

## 2020-07-02 PROCEDURE — 80053 COMPREHEN METABOLIC PANEL: CPT

## 2021-07-26 PROCEDURE — 87624 HPV HI-RISK TYP POOLED RSLT: CPT | Performed by: OBSTETRICS & GYNECOLOGY

## 2021-07-26 PROCEDURE — 88175 CYTOPATH C/V AUTO FLUID REDO: CPT | Performed by: OBSTETRICS & GYNECOLOGY

## 2021-08-02 PROCEDURE — 88305 TISSUE EXAM BY PATHOLOGIST: CPT | Performed by: OBSTETRICS & GYNECOLOGY

## 2022-01-29 ENCOUNTER — LAB ENCOUNTER (OUTPATIENT)
Dept: LAB | Age: 42
End: 2022-01-29
Attending: NURSE PRACTITIONER
Payer: COMMERCIAL

## 2022-01-29 DIAGNOSIS — F90.0 ATTENTION DEFICIT HYPERACTIVITY DISORDER (ADHD), PREDOMINANTLY INATTENTIVE TYPE: ICD-10-CM

## 2022-01-29 DIAGNOSIS — F43.10 POSTTRAUMATIC STRESS DISORDER: ICD-10-CM

## 2022-01-29 DIAGNOSIS — E53.8 VITAMIN B12 DEFICIENCY: ICD-10-CM

## 2022-01-29 DIAGNOSIS — E55.9 VITAMIN D DEFICIENCY: ICD-10-CM

## 2022-01-29 DIAGNOSIS — F31.9 BIPOLAR AFFECTIVE DISORDER, REMISSION STATUS UNSPECIFIED (HCC): ICD-10-CM

## 2022-01-29 DIAGNOSIS — F42.9 OBSESSIVE-COMPULSIVE DISORDER, UNSPECIFIED TYPE: ICD-10-CM

## 2022-01-29 LAB
ALBUMIN SERPL-MCNC: 4 G/DL (ref 3.4–5)
ALBUMIN/GLOB SERPL: 1.4 {RATIO} (ref 1–2)
ALP LIVER SERPL-CCNC: 87 U/L
ALT SERPL-CCNC: 30 U/L
ANION GAP SERPL CALC-SCNC: 3 MMOL/L (ref 0–18)
AST SERPL-CCNC: 30 U/L (ref 15–37)
BASOPHILS # BLD AUTO: 0.02 X10(3) UL (ref 0–0.2)
BASOPHILS NFR BLD AUTO: 0.3 %
BILIRUB SERPL-MCNC: 0.4 MG/DL (ref 0.1–2)
BUN BLD-MCNC: 17 MG/DL (ref 7–18)
CALCIUM BLD-MCNC: 9.8 MG/DL (ref 8.5–10.1)
CHLORIDE SERPL-SCNC: 109 MMOL/L (ref 98–112)
CHOLEST SERPL-MCNC: 177 MG/DL (ref ?–200)
CO2 SERPL-SCNC: 28 MMOL/L (ref 21–32)
CREAT BLD-MCNC: 0.99 MG/DL
EOSINOPHIL # BLD AUTO: 0.04 X10(3) UL (ref 0–0.7)
EOSINOPHIL NFR BLD AUTO: 0.6 %
ERYTHROCYTE [DISTWIDTH] IN BLOOD BY AUTOMATED COUNT: 13.5 %
EST. AVERAGE GLUCOSE BLD GHB EST-MCNC: 117 MG/DL (ref 68–126)
FASTING PATIENT LIPID ANSWER: NO
FASTING STATUS PATIENT QL REPORTED: NO
FOLATE SERPL-MCNC: 17.7 NG/ML (ref 8.7–?)
GLOBULIN PLAS-MCNC: 2.8 G/DL (ref 2.8–4.4)
GLUCOSE BLD-MCNC: 89 MG/DL (ref 70–99)
HBA1C MFR BLD: 5.7 % (ref ?–5.7)
HCT VFR BLD AUTO: 37.9 %
HDLC SERPL-MCNC: 53 MG/DL (ref 40–59)
HGB BLD-MCNC: 12.6 G/DL
IMM GRANULOCYTES # BLD AUTO: 0.02 X10(3) UL (ref 0–1)
IMM GRANULOCYTES NFR BLD: 0.3 %
LDLC SERPL CALC-MCNC: 101 MG/DL (ref ?–100)
LYMPHOCYTES # BLD AUTO: 1.83 X10(3) UL (ref 1–4)
LYMPHOCYTES NFR BLD AUTO: 26.1 %
MCH RBC QN AUTO: 30.1 PG (ref 26–34)
MCHC RBC AUTO-ENTMCNC: 33.2 G/DL (ref 31–37)
MCV RBC AUTO: 90.7 FL
MONOCYTES # BLD AUTO: 0.4 X10(3) UL (ref 0.1–1)
MONOCYTES NFR BLD AUTO: 5.7 %
NEUTROPHILS # BLD AUTO: 4.71 X10 (3) UL (ref 1.5–7.7)
NEUTROPHILS # BLD AUTO: 4.71 X10(3) UL (ref 1.5–7.7)
NEUTROPHILS NFR BLD AUTO: 67 %
NONHDLC SERPL-MCNC: 124 MG/DL (ref ?–130)
OSMOLALITY SERPL CALC.SUM OF ELEC: 291 MOSM/KG (ref 275–295)
PLATELET # BLD AUTO: 187 10(3)UL (ref 150–450)
POTASSIUM SERPL-SCNC: 4.7 MMOL/L (ref 3.5–5.1)
PROT SERPL-MCNC: 6.8 G/DL (ref 6.4–8.2)
RBC # BLD AUTO: 4.18 X10(6)UL
SODIUM SERPL-SCNC: 140 MMOL/L (ref 136–145)
T4 FREE SERPL-MCNC: 0.8 NG/DL (ref 0.8–1.7)
TRIGL SERPL-MCNC: 130 MG/DL (ref 30–149)
TSI SER-ACNC: 0.7 MIU/ML (ref 0.36–3.74)
VIT B12 SERPL-MCNC: 550 PG/ML (ref 193–986)
VIT D+METAB SERPL-MCNC: 43.5 NG/ML (ref 30–100)
VLDLC SERPL CALC-MCNC: 22 MG/DL (ref 0–30)
WBC # BLD AUTO: 7 X10(3) UL (ref 4–11)

## 2022-01-29 PROCEDURE — 82306 VITAMIN D 25 HYDROXY: CPT

## 2022-01-29 PROCEDURE — 36415 COLL VENOUS BLD VENIPUNCTURE: CPT

## 2022-01-29 PROCEDURE — 85025 COMPLETE CBC W/AUTO DIFF WBC: CPT

## 2022-01-29 PROCEDURE — 83036 HEMOGLOBIN GLYCOSYLATED A1C: CPT

## 2022-01-29 PROCEDURE — 80053 COMPREHEN METABOLIC PANEL: CPT

## 2022-01-29 PROCEDURE — 82746 ASSAY OF FOLIC ACID SERUM: CPT

## 2022-01-29 PROCEDURE — 82607 VITAMIN B-12: CPT

## 2022-01-29 PROCEDURE — 84439 ASSAY OF FREE THYROXINE: CPT

## 2022-01-29 PROCEDURE — 80061 LIPID PANEL: CPT

## 2022-01-29 PROCEDURE — 84443 ASSAY THYROID STIM HORMONE: CPT

## 2022-03-15 PROBLEM — R73.01 IFG (IMPAIRED FASTING GLUCOSE): Status: ACTIVE | Noted: 2022-03-15

## 2022-04-30 ENCOUNTER — EKG ENCOUNTER (OUTPATIENT)
Dept: LAB | Age: 42
End: 2022-04-30
Attending: OBSTETRICS & GYNECOLOGY
Payer: COMMERCIAL

## 2022-04-30 ENCOUNTER — PATIENT MESSAGE (OUTPATIENT)
Dept: FAMILY MEDICINE CLINIC | Facility: CLINIC | Age: 42
End: 2022-04-30

## 2022-04-30 ENCOUNTER — NURSE ONLY (OUTPATIENT)
Dept: LAB | Age: 42
End: 2022-04-30
Attending: OBSTETRICS & GYNECOLOGY
Payer: COMMERCIAL

## 2022-04-30 DIAGNOSIS — F43.10 POSTTRAUMATIC STRESS DISORDER: ICD-10-CM

## 2022-04-30 DIAGNOSIS — F42.9 OBSESSIVE-COMPULSIVE DISORDER, UNSPECIFIED TYPE: ICD-10-CM

## 2022-04-30 DIAGNOSIS — Z13.220 SCREENING FOR LIPOID DISORDERS: Primary | ICD-10-CM

## 2022-04-30 DIAGNOSIS — F90.0 ATTENTION DEFICIT HYPERACTIVITY DISORDER (ADHD), PREDOMINANTLY INATTENTIVE TYPE: ICD-10-CM

## 2022-04-30 DIAGNOSIS — F31.9 BIPOLAR AFFECTIVE DISORDER, REMISSION STATUS UNSPECIFIED (HCC): ICD-10-CM

## 2022-04-30 LAB
ATRIAL RATE: 52 BPM
P AXIS: 35 DEGREES
P-R INTERVAL: 208 MS
Q-T INTERVAL: 398 MS
QRS DURATION: 78 MS
QTC CALCULATION (BEZET): 370 MS
R AXIS: 81 DEGREES
T AXIS: 38 DEGREES
VENTRICULAR RATE: 52 BPM

## 2022-04-30 PROCEDURE — 93005 ELECTROCARDIOGRAM TRACING: CPT

## 2022-04-30 PROCEDURE — 93010 ELECTROCARDIOGRAM REPORT: CPT | Performed by: INTERNAL MEDICINE

## 2022-05-02 NOTE — TELEPHONE ENCOUNTER
See pt message, can a sleep study be ordered for pt or does she need an appointment?     Last OV 3/15/22-establish care

## 2022-05-02 NOTE — TELEPHONE ENCOUNTER
From: Rossana Villeda  To: Yves Walker MD  Sent: 4/30/2022 10:43 AM CDT  Subject: Sleep Apnea - Check     Good Morning, I wanted to check in as I have been experiencing, what I think may be sleep apnea. I am working with my psychiatrist, Kylie Guillory, on my memory as well. I know sleep apnea can also contribute to memory loss. What would be next steps?      Thanks,  Lexie Oneil

## 2022-06-01 ENCOUNTER — HOSPITAL ENCOUNTER (OUTPATIENT)
Dept: GENERAL RADIOLOGY | Age: 42
Discharge: HOME | End: 2022-06-01
Attending: FAMILY MEDICINE
Payer: COMMERCIAL

## 2022-06-01 DIAGNOSIS — M25.571 ACUTE RIGHT ANKLE PAIN: ICD-10-CM

## 2022-06-01 PROCEDURE — 73610 X-RAY EXAM OF ANKLE: CPT | Performed by: FAMILY MEDICINE

## 2022-06-29 ENCOUNTER — OFFICE VISIT (OUTPATIENT)
Dept: SLEEP CENTER | Age: 42
End: 2022-06-29
Attending: FAMILY MEDICINE
Payer: COMMERCIAL

## 2022-06-29 DIAGNOSIS — R06.83 SNORING: ICD-10-CM

## 2022-06-29 DIAGNOSIS — R53.83 FATIGUE, UNSPECIFIED TYPE: ICD-10-CM

## 2022-06-29 DIAGNOSIS — F51.3 SLEEP WALKING: ICD-10-CM

## 2022-06-29 PROCEDURE — 95810 POLYSOM 6/> YRS 4/> PARAM: CPT

## 2022-07-13 ENCOUNTER — TELEPHONE (OUTPATIENT)
Dept: FAMILY MEDICINE CLINIC | Facility: CLINIC | Age: 42
End: 2022-07-13

## 2022-07-13 NOTE — TELEPHONE ENCOUNTER
Future Appointments   Date Time Provider Yvan Logan   7/18/2022  9:30 AM ASHLEY [facility]   7/19/2022 11:40 AM Souleymane Kovacs MD [facility] [address] [facility] [address]     Used an open slot in schedule.

## 2022-07-13 NOTE — TELEPHONE ENCOUNTER
Patient calling, patient states Psychiatry referred patient for sleep study.  Sleep study WNL, recommended to see sleep specialist. Patient scheduled to see specialist on 7-15, asking if visit needed even though sleep study was normal. Will like to discuss with PCP, please advise on poss appointment with PCP

## 2022-07-19 ENCOUNTER — TELEPHONE (OUTPATIENT)
Dept: FAMILY MEDICINE CLINIC | Facility: CLINIC | Age: 42
End: 2022-07-19

## 2022-07-19 ENCOUNTER — OFFICE VISIT (OUTPATIENT)
Dept: FAMILY MEDICINE CLINIC | Facility: CLINIC | Age: 42
End: 2022-07-19
Payer: COMMERCIAL

## 2022-07-19 VITALS
DIASTOLIC BLOOD PRESSURE: 60 MMHG | TEMPERATURE: 99 F | OXYGEN SATURATION: 98 % | HEART RATE: 65 BPM | SYSTOLIC BLOOD PRESSURE: 100 MMHG | RESPIRATION RATE: 16 BRPM | HEIGHT: 64 IN | BODY MASS INDEX: 23.9 KG/M2 | WEIGHT: 140 LBS

## 2022-07-19 DIAGNOSIS — R41.0 CONFUSION: ICD-10-CM

## 2022-07-19 DIAGNOSIS — F51.3 SLEEPWALKING: ICD-10-CM

## 2022-07-19 DIAGNOSIS — R41.3 MEMORY DIFFICULTIES: Primary | ICD-10-CM

## 2022-07-19 PROCEDURE — 3008F BODY MASS INDEX DOCD: CPT | Performed by: FAMILY MEDICINE

## 2022-07-19 PROCEDURE — 99214 OFFICE O/P EST MOD 30 MIN: CPT | Performed by: FAMILY MEDICINE

## 2022-07-19 PROCEDURE — 3074F SYST BP LT 130 MM HG: CPT | Performed by: FAMILY MEDICINE

## 2022-07-19 PROCEDURE — 3078F DIAST BP <80 MM HG: CPT | Performed by: FAMILY MEDICINE

## 2022-07-19 NOTE — TELEPHONE ENCOUNTER
Sleep center called back to advise that sleep study results are now available in Epic under procedures tab.

## 2022-07-19 NOTE — TELEPHONE ENCOUNTER
Spoke with [facility] [employer] to inquire about 's sleep study results, representative will call this writer back to follow up results being put into Epic.

## 2022-07-20 NOTE — TELEPHONE ENCOUNTER
Sleep study 6/29/22 was indeed normal without other pathology found. Spoke with her spouse Rukhsana Rain and says was sleepwalking x 3 mo and then ~3wk-1mo ago stopped. Never slept walk as she is aware of prior to this time. Will forgo pulm referral at this time. Pt sees Marielle Chau for psychiatry. They plan to increase her concerta to see if helps her ADHD in 2 wks. Update on chart. She will f/u with her in 1 month. They brought up the possibility of Tourrette's as she has some tics that are worse when anxious.      She will have her f/u with me in after seeing neurology

## 2022-08-01 ENCOUNTER — ORDER TRANSCRIPTION (OUTPATIENT)
Dept: ADMINISTRATIVE | Facility: HOSPITAL | Age: 42
End: 2022-08-01

## 2022-08-01 DIAGNOSIS — Z12.31 ENCOUNTER FOR MAMMOGRAM TO ESTABLISH BASELINE MAMMOGRAM: Primary | ICD-10-CM

## 2022-08-06 ENCOUNTER — HOSPITAL ENCOUNTER (OUTPATIENT)
Dept: MAMMOGRAPHY | Age: 42
Discharge: HOME | End: 2022-08-06
Attending: FAMILY MEDICINE
Payer: COMMERCIAL

## 2022-08-06 DIAGNOSIS — Z12.31 ENCOUNTER FOR SCREENING MAMMOGRAM FOR MALIGNANT NEOPLASM OF BREAST: ICD-10-CM

## 2022-08-06 DIAGNOSIS — Z12.31 ENCOUNTER FOR MAMMOGRAM TO ESTABLISH BASELINE MAMMOGRAM: ICD-10-CM

## 2022-08-06 PROCEDURE — 77067 SCR MAMMO BI INCL CAD: CPT | Performed by: FAMILY MEDICINE

## 2022-08-06 PROCEDURE — 77063 BREAST TOMOSYNTHESIS BI: CPT | Performed by: FAMILY MEDICINE

## 2022-08-22 ENCOUNTER — HOSPITAL ENCOUNTER (OUTPATIENT)
Dept: ULTRASOUND IMAGING | Age: 42
Discharge: HOME | End: 2022-08-22
Attending: FAMILY MEDICINE
Payer: COMMERCIAL

## 2022-08-22 ENCOUNTER — HOSPITAL ENCOUNTER (OUTPATIENT)
Dept: MAMMOGRAPHY | Age: 42
Discharge: HOME | End: 2022-08-22
Attending: FAMILY MEDICINE
Payer: COMMERCIAL

## 2022-08-22 DIAGNOSIS — R92.2 INCONCLUSIVE MAMMOGRAM: ICD-10-CM

## 2022-08-22 PROCEDURE — 77061 BREAST TOMOSYNTHESIS UNI: CPT | Performed by: FAMILY MEDICINE

## 2022-08-22 PROCEDURE — 77065 DX MAMMO INCL CAD UNI: CPT | Performed by: FAMILY MEDICINE

## 2022-08-22 PROCEDURE — 76642 ULTRASOUND BREAST LIMITED: CPT | Performed by: FAMILY MEDICINE

## 2022-08-22 NOTE — IMAGING NOTE
Spoke with pt re: stereotactic guided left breast biopsy x 1 site per the recommendation of Dr Judith Prader, BIRADS 4B. Pt denies having blood dyscrasia's, being on blood thinners, recent chemo or liver disease. Procedure reviewed and advised on the day of the biopsy (prior to coming in) the pt should take Tylenol 1000mg po, eat a light meal & bring a sports bra. Post biopsy care briefly reviewed with lifting/activity restrictions. Informed that the breast schedulers will reach out to the pt once the order is received from her physician. Pt verbalized understanding and denies further questions or concerns, emotional support provided.

## 2022-08-31 ENCOUNTER — HOSPITAL ENCOUNTER (OUTPATIENT)
Dept: MAMMOGRAPHY | Facility: HOSPITAL | Age: 42
Discharge: HOME | End: 2022-08-31
Attending: FAMILY MEDICINE
Payer: COMMERCIAL

## 2022-08-31 DIAGNOSIS — R92.1 BREAST CALCIFICATIONS: ICD-10-CM

## 2022-08-31 PROCEDURE — 88305 TISSUE EXAM BY PATHOLOGIST: CPT | Performed by: FAMILY MEDICINE

## 2022-08-31 PROCEDURE — 88342 IMHCHEM/IMCYTCHM 1ST ANTB: CPT | Performed by: FAMILY MEDICINE

## 2022-08-31 PROCEDURE — 19081 BX BREAST 1ST LESION STRTCTC: CPT | Performed by: FAMILY MEDICINE

## 2022-09-02 ENCOUNTER — TELEPHONE (OUTPATIENT)
Dept: MAMMOGRAPHY | Facility: HOSPITAL | Age: 42
End: 2022-09-02

## 2022-09-02 NOTE — TELEPHONE ENCOUNTER
Telephoned Nora Ley and name,  verified with patient. Notified Nora Ley of left breast negative for malignancy but positive for [facility] biopsy result. Concordance pending. Nora Av reports biopsy site is healing well. Radiologist recommends surgical consultation. Dr Rufino Tamez office is referring patient to Dr Deja Wang. Patient given the contact information for Dr Deja Wang and instructed to call for a consultation appt. Pt verbalized understanding and had no further questions at this time.

## 2022-09-17 PROBLEM — N60.92 ATYPICAL LOBULAR HYPERPLASIA (ALH) OF LEFT BREAST: Status: ACTIVE | Noted: 2022-08-31

## 2022-09-22 ENCOUNTER — APPOINTMENT (OUTPATIENT)
Dept: CT IMAGING | Age: 42
End: 2022-09-22
Attending: EMERGENCY MEDICINE

## 2022-09-22 ENCOUNTER — HOSPITAL ENCOUNTER (EMERGENCY)
Age: 42
Discharge: HOME | End: 2022-09-22
Attending: EMERGENCY MEDICINE

## 2022-09-22 VITALS
WEIGHT: 140 LBS | RESPIRATION RATE: 16 BRPM | SYSTOLIC BLOOD PRESSURE: 121 MMHG | HEART RATE: 50 BPM | DIASTOLIC BLOOD PRESSURE: 73 MMHG | BODY MASS INDEX: 23.9 KG/M2 | TEMPERATURE: 97 F | OXYGEN SATURATION: 98 % | HEIGHT: 64 IN
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
## 2022-09-22 DIAGNOSIS — R51.9 NONINTRACTABLE HEADACHE, UNSPECIFIED CHRONICITY PATTERN, UNSPECIFIED HEADACHE TYPE: ICD-10-CM

## 2022-09-22 DIAGNOSIS — S01.85XA DOG BITE OF FACE, INITIAL ENCOUNTER: Primary | ICD-10-CM

## 2022-09-22 LAB
ALBUMIN SERPL-MCNC: 3.6 G/DL (ref 3.4–5)
ALBUMIN/GLOB SERPL: 1.2 {RATIO} (ref 1–2)
ALP LIVER SERPL-CCNC: 74 U/L
ALT SERPL-CCNC: 23 U/L
ANION GAP SERPL CALC-SCNC: 4 MMOL/L (ref 0–18)
AST SERPL-CCNC: 26 U/L (ref 15–37)
BASOPHILS # BLD AUTO: 0.03 X10(3) UL (ref 0–0.2)
BASOPHILS NFR BLD AUTO: 0.6 %
BILIRUB SERPL-MCNC: 0.4 MG/DL (ref 0.1–2)
BUN BLD-MCNC: 11 MG/DL (ref 7–18)
CALCIUM BLD-MCNC: 9.1 MG/DL (ref 8.5–10.1)
CHLORIDE SERPL-SCNC: 108 MMOL/L (ref 98–112)
CO2 SERPL-SCNC: 28 MMOL/L (ref 21–32)
CREAT BLD-MCNC: 0.81 MG/DL
EOSINOPHIL # BLD AUTO: 0.14 X10(3) UL (ref 0–0.7)
EOSINOPHIL NFR BLD AUTO: 2.9 %
ERYTHROCYTE [DISTWIDTH] IN BLOOD BY AUTOMATED COUNT: 13.9 %
GFR SERPLBLD BASED ON 1.73 SQ M-ARVRAT: 93 ML/MIN/1.73M2 (ref 60–?)
GLOBULIN PLAS-MCNC: 3.1 G/DL (ref 2.8–4.4)
GLUCOSE BLD-MCNC: 91 MG/DL (ref 70–99)
HCT VFR BLD AUTO: 37.1 %
HGB BLD-MCNC: 12.3 G/DL
IMM GRANULOCYTES # BLD AUTO: 0.01 X10(3) UL (ref 0–1)
IMM GRANULOCYTES NFR BLD: 0.2 %
LYMPHOCYTES # BLD AUTO: 1.56 X10(3) UL (ref 1–4)
LYMPHOCYTES NFR BLD AUTO: 32.4 %
MCH RBC QN AUTO: 30.4 PG (ref 26–34)
MCHC RBC AUTO-ENTMCNC: 33.2 G/DL (ref 31–37)
MCV RBC AUTO: 91.8 FL
MONOCYTES # BLD AUTO: 0.46 X10(3) UL (ref 0.1–1)
MONOCYTES NFR BLD AUTO: 9.6 %
NEUTROPHILS # BLD AUTO: 2.61 X10 (3) UL (ref 1.5–7.7)
NEUTROPHILS # BLD AUTO: 2.61 X10(3) UL (ref 1.5–7.7)
NEUTROPHILS NFR BLD AUTO: 54.3 %
OSMOLALITY SERPL CALC.SUM OF ELEC: 289 MOSM/KG (ref 275–295)
PLATELET # BLD AUTO: 195 10(3)UL (ref 150–450)
POTASSIUM SERPL-SCNC: 4.4 MMOL/L (ref 3.5–5.1)
PROT SERPL-MCNC: 6.7 G/DL (ref 6.4–8.2)
RBC # BLD AUTO: 4.04 X10(6)UL
SODIUM SERPL-SCNC: 140 MMOL/L (ref 136–145)
WBC # BLD AUTO: 4.8 X10(3) UL (ref 4–11)

## 2022-09-22 PROCEDURE — 96375 TX/PRO/DX INJ NEW DRUG ADDON: CPT

## 2022-09-22 PROCEDURE — 90471 IMMUNIZATION ADMIN: CPT

## 2022-09-22 PROCEDURE — 96366 THER/PROPH/DIAG IV INF ADDON: CPT

## 2022-09-22 PROCEDURE — 70450 CT HEAD/BRAIN W/O DYE: CPT | Performed by: EMERGENCY MEDICINE

## 2022-09-22 PROCEDURE — 85025 COMPLETE CBC W/AUTO DIFF WBC: CPT | Performed by: EMERGENCY MEDICINE

## 2022-09-22 PROCEDURE — 99284 EMERGENCY DEPT VISIT MOD MDM: CPT

## 2022-09-22 PROCEDURE — 96365 THER/PROPH/DIAG IV INF INIT: CPT

## 2022-09-22 PROCEDURE — 80053 COMPREHEN METABOLIC PANEL: CPT | Performed by: EMERGENCY MEDICINE

## 2022-09-22 RX ORDER — METOCLOPRAMIDE HYDROCHLORIDE 5 MG/ML
10 INJECTION INTRAMUSCULAR; INTRAVENOUS ONCE
Status: COMPLETED | OUTPATIENT
Start: 2022-09-22 | End: 2022-09-22

## 2022-09-22 RX ORDER — HYDROCODONE BITARTRATE AND ACETAMINOPHEN 5; 325 MG/1; MG/1
1-2 TABLET ORAL EVERY 6 HOURS PRN
Qty: 12 TABLET | Refills: 0 | Status: SHIPPED | OUTPATIENT
Start: 2022-09-22 | End: 2022-09-29

## 2022-09-22 RX ORDER — DIPHENHYDRAMINE HYDROCHLORIDE 50 MG/ML
25 INJECTION INTRAMUSCULAR; INTRAVENOUS ONCE
Status: COMPLETED | OUTPATIENT
Start: 2022-09-22 | End: 2022-09-22

## 2022-09-22 RX ORDER — AMOXICILLIN AND CLAVULANATE POTASSIUM 875; 125 MG/1; MG/1
1 TABLET, FILM COATED ORAL 2 TIMES DAILY
Qty: 20 TABLET | Refills: 0 | Status: SHIPPED | OUTPATIENT
Start: 2022-09-22 | End: 2022-10-02

## 2022-09-22 RX ORDER — TETANUS AND DIPHTHERIA TOXOIDS ADSORBED 2; 2 [LF]/.5ML; [LF]/.5ML
0.5 INJECTION INTRAMUSCULAR ONCE
Status: COMPLETED | OUTPATIENT
Start: 2022-09-22 | End: 2022-09-22

## 2022-09-22 RX ORDER — KETOROLAC TROMETHAMINE 15 MG/ML
15 INJECTION, SOLUTION INTRAMUSCULAR; INTRAVENOUS ONCE
Status: COMPLETED | OUTPATIENT
Start: 2022-09-22 | End: 2022-09-22

## 2022-09-27 ENCOUNTER — OFFICE VISIT (OUTPATIENT)
Dept: NEUROLOGY | Facility: CLINIC | Age: 42
End: 2022-09-27

## 2022-09-27 ENCOUNTER — OFFICE VISIT (OUTPATIENT)
Dept: SURGERY | Facility: CLINIC | Age: 42
End: 2022-09-27
Payer: COMMERCIAL

## 2022-09-27 VITALS
SYSTOLIC BLOOD PRESSURE: 110 MMHG | BODY MASS INDEX: 24.41 KG/M2 | DIASTOLIC BLOOD PRESSURE: 72 MMHG | HEIGHT: 64 IN | HEART RATE: 67 BPM | OXYGEN SATURATION: 97 % | RESPIRATION RATE: 16 BRPM | TEMPERATURE: 98 F | WEIGHT: 143 LBS

## 2022-09-27 VITALS
WEIGHT: 143 LBS | HEART RATE: 80 BPM | HEIGHT: 64 IN | RESPIRATION RATE: 16 BRPM | DIASTOLIC BLOOD PRESSURE: 66 MMHG | BODY MASS INDEX: 24.41 KG/M2 | SYSTOLIC BLOOD PRESSURE: 110 MMHG

## 2022-09-27 DIAGNOSIS — N60.92 ATYPICAL LOBULAR HYPERPLASIA (ALH) OF LEFT BREAST: Primary | ICD-10-CM

## 2022-09-27 DIAGNOSIS — R41.3 MEMORY DEFICIT: Primary | ICD-10-CM

## 2022-09-27 PROCEDURE — 3008F BODY MASS INDEX DOCD: CPT | Performed by: SURGERY

## 2022-09-27 PROCEDURE — 99244 OFF/OP CNSLTJ NEW/EST MOD 40: CPT | Performed by: SURGERY

## 2022-09-27 PROCEDURE — 99244 OFF/OP CNSLTJ NEW/EST MOD 40: CPT | Performed by: OTHER

## 2022-09-27 PROCEDURE — 3078F DIAST BP <80 MM HG: CPT | Performed by: OTHER

## 2022-09-27 PROCEDURE — 3008F BODY MASS INDEX DOCD: CPT | Performed by: OTHER

## 2022-09-27 PROCEDURE — 3074F SYST BP LT 130 MM HG: CPT | Performed by: SURGERY

## 2022-09-27 PROCEDURE — 3078F DIAST BP <80 MM HG: CPT | Performed by: SURGERY

## 2022-09-27 PROCEDURE — 3074F SYST BP LT 130 MM HG: CPT | Performed by: OTHER

## 2022-09-29 ENCOUNTER — OFFICE VISIT (OUTPATIENT)
Dept: FAMILY MEDICINE CLINIC | Facility: CLINIC | Age: 42
End: 2022-09-29

## 2022-09-29 VITALS
HEIGHT: 64 IN | HEART RATE: 62 BPM | TEMPERATURE: 98 F | SYSTOLIC BLOOD PRESSURE: 110 MMHG | DIASTOLIC BLOOD PRESSURE: 70 MMHG | RESPIRATION RATE: 16 BRPM | OXYGEN SATURATION: 99 % | WEIGHT: 143 LBS | BODY MASS INDEX: 24.41 KG/M2

## 2022-09-29 DIAGNOSIS — Z28.21 INFLUENZA VACCINATION DECLINED BY PATIENT: ICD-10-CM

## 2022-09-29 DIAGNOSIS — S06.0X0D CONCUSSION WITHOUT LOSS OF CONSCIOUSNESS, SUBSEQUENT ENCOUNTER: ICD-10-CM

## 2022-09-29 DIAGNOSIS — S01.85XD DOG BITE OF FACE, SUBSEQUENT ENCOUNTER: Primary | ICD-10-CM
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
## 2022-09-29 DIAGNOSIS — R41.3 MEMORY DIFFICULTY: ICD-10-CM

## 2022-09-29 DIAGNOSIS — N60.92 ATYPICAL LOBULAR HYPERPLASIA (ALH) OF LEFT BREAST: ICD-10-CM

## 2022-09-29 PROCEDURE — 3008F BODY MASS INDEX DOCD: CPT | Performed by: FAMILY MEDICINE

## 2022-09-29 PROCEDURE — 3078F DIAST BP <80 MM HG: CPT | Performed by: FAMILY MEDICINE

## 2022-09-29 PROCEDURE — 3074F SYST BP LT 130 MM HG: CPT | Performed by: FAMILY MEDICINE

## 2022-09-29 PROCEDURE — 99214 OFFICE O/P EST MOD 30 MIN: CPT | Performed by: FAMILY MEDICINE

## 2022-09-29 RX ORDER — HYDROCODONE BITARTRATE AND ACETAMINOPHEN 5; 325 MG/1; MG/1
1-2 TABLET ORAL EVERY 6 HOURS PRN
Qty: 12 TABLET | Refills: 0 | Status: SHIPPED | OUTPATIENT
Start: 2022-09-29 | End: 2022-10-06

## 2022-10-05 ENCOUNTER — TELEPHONE (OUTPATIENT)
Dept: FAMILY MEDICINE CLINIC | Facility: CLINIC | Age: 42
End: 2022-10-05

## 2022-10-05 NOTE — TELEPHONE ENCOUNTER
Patient states she is a  and told at the last visit on 9/29 that she may have a concussion and to \"not move around too much with the kids\" and needs a note for her work stating that, please advise.

## 2022-10-05 NOTE — TELEPHONE ENCOUNTER
Last OV 9/29/22  . Concussion without loss of consciousness, subsequent encounter  - HYDROcodone-acetaminophen 5-325 MG Oral Tab; Take 1-2 tablets by mouth every 6 (six) hours as needed for Pain (do not take this medication prior to drinking alcohol or driving as this medication can impair your senses. ). Dispense: 12 tablet; Refill: 0  -discussed importance of no contact sports to avoid a 2nd concussion within the next 30 days.   -discussed giving herself reza and listening to her body. Ok'ed walking and light jogging. If it worsens her sxs then she needs to not do it.  -when past her 30d she may gradually return to activities.     Pt requesting note for work

## 2022-10-11 ENCOUNTER — HOSPITAL ENCOUNTER (OUTPATIENT)
Dept: MRI IMAGING | Facility: HOSPITAL | Age: 42
Discharge: HOME | End: 2022-10-11
Attending: SURGERY
Payer: COMMERCIAL

## 2022-10-11 DIAGNOSIS — N60.92 ATYPICAL LOBULAR HYPERPLASIA (ALH) OF LEFT BREAST: ICD-10-CM

## 2022-10-11 PROCEDURE — A9575 INJ GADOTERATE MEGLUMI 0.1ML: HCPCS | Performed by: SURGERY

## 2022-10-11 PROCEDURE — 77049 MRI BREAST C-+ W/CAD BI: CPT | Performed by: SURGERY

## 2022-10-13 DIAGNOSIS — N60.92 ATYPICAL LOBULAR HYPERPLASIA (ALH) OF LEFT BREAST: ICD-10-CM

## 2022-10-13 DIAGNOSIS — R92.8 ABNORMAL MRI, BREAST: Primary | ICD-10-CM

## 2022-11-01 ENCOUNTER — PATIENT MESSAGE (OUTPATIENT)
Dept: FAMILY MEDICINE CLINIC | Facility: CLINIC | Age: 42
End: 2022-11-01

## 2022-11-02 NOTE — TELEPHONE ENCOUNTER
From: Mariaa Culver  To: Laury Pugh MD  Sent: 11/1/2022 6:28 PM CDT  Subject: Intermittent FMLA paperwork    Hi Dr Merlinda Silver,    I wanted to check in with you as the next steps in the irregular cells in my breast is another Ultrasound. With an unknown future with more testing and/or procedures I was urged by my  to have Intermittent FMLA paperwork filled out to cover me throughout this process. Once we get these breast cells figured out I will be doing the EEG and MRI for my brain that was requested by my neurologist. Are you able, as my primary, to fill out the paperwork? Is it possible to fax it to you and have it faxed to my  or best to drop off and ? Thank you!     Monika Yen

## 2022-11-03 NOTE — TELEPHONE ENCOUNTER
See most recent pt message, please advise if pt can be fit in for a virtual in any of those time slots. Thank you.

## 2022-11-09 ENCOUNTER — TELEMEDICINE (OUTPATIENT)
Dept: FAMILY MEDICINE CLINIC | Facility: CLINIC | Age: 42
End: 2022-11-09
Payer: COMMERCIAL

## 2022-11-09 DIAGNOSIS — Z02.89 ENCOUNTER FOR COMPLETION OF FORM WITH PATIENT: ICD-10-CM

## 2022-11-09 DIAGNOSIS — Z20.828 EXPOSURE TO THE FLU: ICD-10-CM

## 2022-11-09 DIAGNOSIS — N60.99 ATYPICAL DUCTAL HYPERPLASIA OF BREAST: ICD-10-CM

## 2022-11-09 DIAGNOSIS — R50.9 FEVER, UNSPECIFIED FEVER CAUSE: Primary | ICD-10-CM

## 2022-11-09 DIAGNOSIS — R41.3 MEMORY DEFICIT: ICD-10-CM

## 2022-11-09 PROCEDURE — 99442 PHONE E/M BY PHYS 11-20 MIN: CPT | Performed by: FAMILY MEDICINE

## 2022-11-09 NOTE — TELEPHONE ENCOUNTER
Please call pt to schedule an appt as I specified below and remind her to send a copy via [id number]. I'm going to have the  reach out to you to potentially schedule an appt for ~3:20 for tomorrow (Wed 11/9 or Thurs 11/10). I will have them call wed AM. Or send us a message back stating which day you prefer. We'll get your paperwork filled out so you can take time off and not have to worry so much when you do it. If you can download a copy and send to me through [employer], that would help out when filling it out.

## 2022-11-10 ENCOUNTER — TELEPHONE (OUTPATIENT)
Dept: FAMILY MEDICINE CLINIC | Facility: CLINIC | Age: 42
End: 2022-11-10

## 2022-11-10 DIAGNOSIS — R50.9 FEVER, UNSPECIFIED FEVER CAUSE: Primary | ICD-10-CM

## 2022-11-10 RX ORDER — DEXTROMETHORPHAN HYDROBROMIDE AND PROMETHAZINE HYDROCHLORIDE 15; 6.25 MG/5ML; MG/5ML
5 SYRUP ORAL 4 TIMES DAILY PRN
Qty: 180 ML | Refills: 0 | Status: SHIPPED | OUTPATIENT
Start: 2022-11-10

## 2022-11-10 RX ORDER — BENZONATATE 200 MG/1
200 CAPSULE ORAL 3 TIMES DAILY PRN
Qty: 30 CAPSULE | Refills: 0 | Status: SHIPPED | OUTPATIENT
Start: 2022-11-10

## 2022-11-10 NOTE — TELEPHONE ENCOUNTER
Pt did telehealth visit yesterday. She has productive cough which hurts, earache, and when Tylenol runs out, has chills/bodyaches. Anything OTC that she can take? Need to do VV again?

## 2022-11-10 NOTE — TELEPHONE ENCOUNTER
Patient's spouse came into the office and dropped off FMLA ppw for [employer] [facility] [employer]), TV was yesterday, ppw due by Monday, 11/14/22. FAX when ready. Fee ticket and flowsheet attached and placed in MA's folder.

## 2022-11-10 NOTE — TELEPHONE ENCOUNTER
As her son ws diagnosed with influenza, that's what she likely has. Advise her to do a covid test at home. If neg will assume it is influenza. She is not in the high risk categories that we can prescribe an antiviral like oseltamivir, but we can treat her symptoms to make her more comfortable. I erxed a cough syrup and benzonatate. Cont alternating tylenol and ibuprofen. For the ear pain she may take fluticasone nasal spray 2 sprays/nostril daily until better.

## 2022-11-10 NOTE — TELEPHONE ENCOUNTER
Video appt 11/9/22  Assessment/Plan:  1. Fever, unspecified fever cause  2.  Exposure to the flu  -likely flu, to stay home until at least 24hr without fever and symptoms improve      Please advise

## 2022-11-15 ENCOUNTER — HOSPITAL ENCOUNTER (OUTPATIENT)
Dept: MAMMOGRAPHY | Facility: HOSPITAL | Age: 42
Discharge: HOME | End: 2022-11-15
Attending: SURGERY
Payer: COMMERCIAL

## 2022-11-15 ENCOUNTER — TELEPHONE (OUTPATIENT)
Dept: FAMILY MEDICINE CLINIC | Facility: CLINIC | Age: 42
End: 2022-11-15

## 2022-11-15 DIAGNOSIS — R92.8 ABNORMAL MRI, BREAST: ICD-10-CM

## 2022-11-15 DIAGNOSIS — N60.92 ATYPICAL LOBULAR HYPERPLASIA (ALH) OF LEFT BREAST: ICD-10-CM

## 2022-11-15 DIAGNOSIS — R92.8 ABNORMAL MRI, BREAST: Primary | ICD-10-CM

## 2022-11-15 PROCEDURE — 76642 ULTRASOUND BREAST LIMITED: CPT | Performed by: SURGERY

## 2022-11-15 NOTE — TELEPHONE ENCOUNTER
DOS: 2019  NAME: Scarlet Chakraborty   : 1962  PCP: MARILYN Fernandez MD    Chief Complaint   Patient presents with   • Stroke     9 month, follow up      SUBJECTIVE  Neurological Problem:  57 y.o. RHW female with COPD, tobacco abuse and cardiomyopathy who presents today for stroke follow-up. She is accompanied by her spouse. Patient and problem are new to examiner. History is provided by patient, spouse and review of records.     Interval History:   Ms. Chakraborty initially presented to [facility] on 18 with left sided weakness and a fall, NIH 3.  Her CTA showed a right distal M1 occlusive thrombus with significant mismatch.  She received IV TPA with good improvement and therefore not a candidate for embolectomy.  This occurred in the setting of a recent discharge for COPD exacerbation with acute respiratory failure, CHF and STEMI, with a cardiac workup showing likely Takosubo cardiomyopathy.  Her stroke workup included an MRI showing bilateral infarcts of different ages, consistent with a central source, likely cardioembolic.  Her ZARINA was negative and the patient underwent ILR placement.  She was subsequently discharged home on Eliquis 5 mg BID, Lipitor 80 mg and B12 replacement.     Most recent LI and q. report reviewed, no evidence of A. fib.      She presents today and continues on Eliqius but not currently on a statin. She is she is not sure when she discontinued this.  I have no recent lab results from her PCP, will request.  Neurologically she reports that she is essentially back to her baseline, is back to work and all of her usual activities.. She denies any recurrent unilateral weakness, speech difficulty counting vision changes, headaches or any new stroke/TIA symptoms.  She does report that she was recently in the hospital for COPD exacerbation but she denies any other changes in her health. She follows up regularly with pulmonology.  She does continue to smoke about 4-5 cigarettes a day.   Please DB pt with Dr. Ran Saxena tomorrow.  Thank you     Follows up regularly with cardiology.  Her most recent LINQ. report reviewed, no evidence of A. Fib, plans to reassess in Dec. She states her BP is low at times.  She drinks mainly Cokes, not much water.  She denies any symptoms of sleep apnea.    Review of Systems:Review of Systems   Constitutional: Negative for activity change, appetite change and fatigue.   HENT: Negative for facial swelling, trouble swallowing and voice change.    Eyes: Negative for photophobia, pain and visual disturbance.   Respiratory: Positive for shortness of breath. Negative for chest tightness and wheezing.    Cardiovascular: Negative for chest pain, palpitations and leg swelling.   Endocrine: Negative for polydipsia and polyphagia.   Musculoskeletal: Negative for back pain, gait problem and neck pain.   Skin: Negative for rash and wound.   Neurological: Negative for dizziness, tremors, seizures, syncope, facial asymmetry, speech difficulty, weakness, light-headedness, numbness and headaches.   Hematological: Bruises/bleeds easily (bruise/bleed).   Psychiatric/Behavioral: Negative for agitation, behavioral problems, confusion, decreased concentration, dysphoric mood, hallucinations, self-injury, sleep disturbance and suicidal ideas. The patient is not nervous/anxious and is not hyperactive.     Above ROS reviewed    Current Medications:   Current Outpatient Medications:   •  albuterol (PROVENTIL) (2.5 MG/3ML) 0.083% nebulizer solution, Take 2.5 mg by nebulization 4 Times a Day for 30 days., Disp: 360 mL, Rfl: 0  •  albuterol sulfate  (90 Base) MCG/ACT inhaler, Inhale 2 puffs Every 4 (Four) Hours As Needed for Wheezing., Disp: , Rfl:   •  apixaban (ELIQUIS) 5 MG tablet tablet, Take 1 tablet by mouth Every 12 (Twelve) Hours., Disp: 60 tablet, Rfl: 3  •  lisinopril (PRINIVIL,ZESTRIL) 2.5 MG tablet, Take 1 tablet by mouth Daily., Disp: 90 tablet, Rfl: 3  •  Tiotropium Bromide Monohydrate (SPIRIVA RESPIMAT) 1.25 MCG/ACT aerosol  solution inhaler, Inhale 2 puffs Daily., Disp: 4 g, Rfl: 2  •  guaiFENesin (MUCINEX) 600 MG 12 hr tablet, Take 1 tablet by mouth Every 12 (Twelve) Hours., Disp: , Rfl:     The following portions of the patient's history were reviewed and updated as appropriate: allergies, current medications, past family history, past medical history, past social history, past surgical history and problem list.    OBJECTIVE  Vitals:    09/19/19 1308   BP: 126/82   Pulse: 68   Resp: 17   SpO2: 98%       Diagnostics:  LINQ report 8/26/19: No evidence of afib    Laboratory Results:         Lab Results   Component Value Date    WBC 14.49 (H) 09/11/2019    HGB 14.5 09/11/2019    HCT 46.1 09/11/2019    MCV 97.9 (H) 09/11/2019     09/11/2019     Lab Results   Component Value Date    GLUCOSE 112 (H) 09/11/2019    BUN 12 09/11/2019    CREATININE 0.58 09/11/2019    EGFRIFNONA 107 09/11/2019    BCR 20.7 09/11/2019    K 4.2 09/11/2019    CO2 25.4 09/11/2019    CALCIUM 9.4 09/11/2019    ALBUMIN 5.20 09/10/2019    AST 16 09/10/2019    ALT 12 09/10/2019     Lab Results   Component Value Date    HGBA1C 5.40 09/25/2018     Lab Results   Component Value Date    CHOL 143 12/18/2018    CHOL 168 09/25/2018     Lab Results   Component Value Date    HDL 73 (H) 12/18/2018    HDL 53 09/25/2018     Lab Results   Component Value Date    LDL 50 12/18/2018    LDL 90 09/25/2018     Lab Results   Component Value Date    TRIG 99 12/18/2018    TRIG 125 09/25/2018     No results found for: RPR  Lab Results   Component Value Date    TSH 1.010 09/24/2018     Lab Results   Component Value Date    VNWQZZGS24 327 09/25/2018     Physical Exam:  GENERAL: NAD, thin, appears older than stated age  HEENT: Normocephalic, atraumatic   COR: RRR  Resp: Even and unlabored, Moist cough.  Extremities: Equal pulses, no signs of distal embolization.     Neurological:   MS: AO. Language normal. No neglect. Recall (3/3). Higher integrative function normal  CN: II-XII  normal  Motor: Normal strength and tone throughout.  Sensory: Intact to light touch, vibration and temperature in arms and legs.   Station and Gait: Normal gait and station.    Coordination: Normal    Impression:  Ms. Chakraborty continues to do well following her admission in September 2018 with b/l infarcts turning for a cardioembolic source.  She has since been maintained on Eliquis with no recurrent or new stroke/TIA symptoms and a normal neurologic exam today.  Her Linq recorder thus far has not shown any evidence of A. Fib. Per cardiology plans are to continue anti-coagulation, reassess in December.  She is not currently on a statin, will request lab work from her PCP.  We did discuss at length the importance of risk factor control for stroke prevention, especially the importance of smoking cessation.  She will continue to monitor her BP regularly.  We reviewed the signs and symptoms of stroke and the importance of calling 911 if she were to develop any these.  F/U in one year, sooner if symptoms warrant. She voiced understanding and agree with above plan.    Plan:     Continue Eliquis  Obtain lab work from PCP -- ?lipid panel  Monitor BP regularly  Keep well hydrated   Counseled at length on the importance of smoking cessation  Secondary stroke prevention: Ideal targets for stroke prevention would be Blood pressure < 130/80; B12 > 500 TSH in normal range and LDL < 70; HbA1c < 6.5 and smoking cessation if applicable.  Call 911 for stroke symptoms  Follow-up in one year    I spent 25 minutes face to face with patient, with  > 50% spent counseling patient regarding diagnosis, review of diagnostics, personal risk factors for stroke and importance of risk factor control for stroke prevention, including lifestyle modifications and preventative measures.   Scarlet was seen today for stroke.    Diagnoses and all orders for this visit:    Cerebrovascular accident (CVA) due to occlusion of left middle cerebral artery  (CMS/HCC)    Chronic obstructive pulmonary disease with acute exacerbation (CMS/HCC)    Stress-induced cardiomyopathy    Tobacco abuse        Coding      Dictated using Dragon

## 2022-11-15 NOTE — TELEPHONE ENCOUNTER
Pt called and said she is feeling as bad as she did when she was sick just a week or so ago. She said she is having bodyaches and is coughing. She said if something is called in, it can go to [facility].

## 2022-11-16 ENCOUNTER — OFFICE VISIT (OUTPATIENT)
Dept: FAMILY MEDICINE CLINIC | Facility: CLINIC | Age: 42
End: 2022-11-16
Payer: COMMERCIAL

## 2022-11-16 VITALS
HEART RATE: 87 BPM | TEMPERATURE: 97 F | WEIGHT: 136 LBS | OXYGEN SATURATION: 99 % | BODY MASS INDEX: 23.22 KG/M2 | DIASTOLIC BLOOD PRESSURE: 70 MMHG | SYSTOLIC BLOOD PRESSURE: 120 MMHG | RESPIRATION RATE: 16 BRPM | HEIGHT: 64 IN

## 2022-11-16 DIAGNOSIS — J98.01 ACUTE BRONCHOSPASM: ICD-10-CM

## 2022-11-16 DIAGNOSIS — J98.01 ACUTE BRONCHOSPASM: Primary | ICD-10-CM

## 2022-11-16 PROCEDURE — 3008F BODY MASS INDEX DOCD: CPT | Performed by: FAMILY MEDICINE

## 2022-11-16 PROCEDURE — 3078F DIAST BP <80 MM HG: CPT | Performed by: FAMILY MEDICINE

## 2022-11-16 PROCEDURE — 3074F SYST BP LT 130 MM HG: CPT | Performed by: FAMILY MEDICINE

## 2022-11-16 PROCEDURE — 99214 OFFICE O/P EST MOD 30 MIN: CPT | Performed by: FAMILY MEDICINE

## 2022-11-16 RX ORDER — ALBUTEROL SULFATE 90 UG/1
2 AEROSOL, METERED RESPIRATORY (INHALATION) EVERY 4 HOURS PRN
Qty: 1 EACH | Refills: 0 | Status: SHIPPED | OUTPATIENT
Start: 2022-11-16

## 2022-11-16 RX ORDER — BUDESONIDE AND FORMOTEROL FUMARATE DIHYDRATE 160; 4.5 UG/1; UG/1
2 AEROSOL RESPIRATORY (INHALATION) 2 TIMES DAILY
Qty: 1 EACH | Refills: 0 | Status: SHIPPED | OUTPATIENT
Start: 2022-11-16

## 2022-11-16 RX ORDER — PREDNISONE 20 MG/1
TABLET ORAL
Qty: 10 TABLET | Refills: 0 | Status: SHIPPED | OUTPATIENT
Start: 2022-11-16

## 2022-11-16 NOTE — TELEPHONE ENCOUNTER
Future Appointments   Date Time Provider Yvan Logan   11/16/2022  9:30 AM Susan Dunham, DO EMG [address]   11/23/2022  7:30 AM EH MR RM1 (1.5T WIDE)  MRI [facility]   11/25/2022  7:30 AM EEGRM1  EEG [facility]   11/25/2022  9:00 AM  MR RM3 (3T WIDE) [facility] MRI [place] [place]   12/12/2022  4:00 PM Ynes, [address] [facility]

## 2022-11-16 NOTE — PATIENT INSTRUCTIONS
Go for your chest x-ray today. Take the Prednisone 20 mg 2 tablets with breakfast for 5 days. Take your prednisone with a full meal and early in the day. Do not take any Ibuprofen, Advil, Motrin, Naproxen, Aspirin while on Prednisone. Tylenol is OK for fever or pain. Use your albuterol inhaler 2 puffs every 4 hours as needed for chest tightness, shortness of breath or wheezing. Use the Symbicort 160 mcg 2 puffs twice daily for the next 4 weeks or until your cough resolves. Be sure to rinse your mouth and brush your tongue after usage so you do not get thrush which is yeast of the tongue. Push fluids and stay well hydrated. Go to the ER if you develop chest pain, increased difficulty breathing or you are coughing up blood. Follow up with your doctor if you are not improving over the next 7-10 days.

## 2022-11-17 ENCOUNTER — TELEPHONE (OUTPATIENT)
Dept: FAMILY MEDICINE CLINIC | Facility: CLINIC | Age: 42
End: 2022-11-17

## 2022-11-17 ENCOUNTER — PATIENT MESSAGE (OUTPATIENT)
Dept: FAMILY MEDICINE CLINIC | Facility: CLINIC | Age: 42
End: 2022-11-17

## 2022-11-17 ENCOUNTER — HOSPITAL ENCOUNTER (OUTPATIENT)
Dept: GENERAL RADIOLOGY | Age: 42
Discharge: HOME | End: 2022-11-17
Attending: FAMILY MEDICINE
Payer: COMMERCIAL

## 2022-11-17 DIAGNOSIS — J98.01 ACUTE BRONCHOSPASM: ICD-10-CM

## 2022-11-17 PROCEDURE — 71046 X-RAY EXAM CHEST 2 VIEWS: CPT | Performed by: FAMILY MEDICINE

## 2022-11-17 RX ORDER — BUDESONIDE AND FORMOTEROL FUMARATE DIHYDRATE 160; 4.5 UG/1; UG/1
AEROSOL RESPIRATORY (INHALATION)
Qty: 30.6 G | Refills: 0 | OUTPATIENT
Start: 2022-11-17

## 2022-11-17 RX ORDER — FLUTICASONE PROPIONATE AND SALMETEROL 100; 50 UG/1; UG/1
1 POWDER RESPIRATORY (INHALATION) 2 TIMES DAILY
Qty: 60 EACH | Refills: 0 | OUTPATIENT
Start: 2022-11-17

## 2022-11-17 RX ORDER — FLUTICASONE PROPIONATE AND SALMETEROL 100; 50 UG/1; UG/1
1 POWDER RESPIRATORY (INHALATION) 2 TIMES DAILY
Qty: 60 EACH | Refills: 0 | Status: SHIPPED | OUTPATIENT
Start: 2022-11-17

## 2022-11-17 NOTE — TELEPHONE ENCOUNTER
Discussed masking with pt, being presumed positive for influenza previously and now bronchospasm, advised that masking is not necessary at this time. Pt verbalized understanding and thanked for the call back.

## 2022-11-17 NOTE — TELEPHONE ENCOUNTER
pulmicort ampules 0.5 mg/2 ml are $44.16 at [facility]-   pulmicort inhaler will be $250 + on good rx. Advair 100/50- $61 at [facility]-  ok to send advair?

## 2022-11-17 NOTE — TELEPHONE ENCOUNTER
- Pt is calling to see if there is a alternative as the inhaler is 200.00.     Budesonide-Formoterol Fumarate (SYMBICORT) 160-4.5 MCG/ACT Inhalation Aerosol 1 each 0 11/16/2022     Sig - Route: Inhale 2 puffs into the lungs 2 (two) times daily. - Inhalation    Sent to pharmacy as: Budesonide-Formoterol Fumarate 160-4.5 MCG/ACT Inhalation Aerosol (Symbicort)      Please advise Yang 30. [phone]

## 2022-11-17 NOTE — TELEPHONE ENCOUNTER
Last OV 11/16/22 for bronchospasm  Patient is asking for alternative for symbicort  [facility]    Please advise

## 2022-11-17 NOTE — TELEPHONE ENCOUNTER
We can try Pulmicort inhaler 180 mcg 2 puffs twice daily instead. The Good Rx price at [facility] was $44.16 with the discount coupon she can print a home. If she agrees, then send the Rx to her chosen pharmacy.

## 2022-11-17 NOTE — TELEPHONE ENCOUNTER
Yes ok to send Advair 100/50 inhalation bid. She might need the 250/50 dose if her symptoms do not improve. She can discuss this with Dr. Jaqueline Chase.

## 2022-11-17 NOTE — TELEPHONE ENCOUNTER
/- Pt was in the office yesterday and would like to know if she needs to wear a mask around her baby. Pt dosen't want to pass this on to her child.     Please call to advise Yang 30. [phone]

## 2022-11-17 NOTE — TELEPHONE ENCOUNTER
From: Odalys Oakley  To: Giovanni Owens  Sent: 11/17/2022 5:23 PM CST  Subject: Juancho Friendly,   Instead of symbicort- there is advair 100/50. Will cost $61 at [facility] with good rx coupon. This is the cheapest option. [email or web address] [email or web address]?form=diskus-inhaler&[email or web address]&quantity=1&label_override=fluticasone-salmeterol      Let us know if this works and can send to [facility].      Thank you,   Mauricio Tinajero

## 2022-11-18 ENCOUNTER — TELEPHONE (OUTPATIENT)
Dept: FAMILY MEDICINE CLINIC | Facility: CLINIC | Age: 42
End: 2022-11-18

## 2022-11-18 NOTE — TELEPHONE ENCOUNTER
Patient was here to see Dr. Brunilda Mercado 11/16/22 and left some ppw in the room. LMOM to let her know to pick it up. Envelope placed at the .

## 2022-11-19 ENCOUNTER — PATIENT MESSAGE (OUTPATIENT)
Dept: FAMILY MEDICINE CLINIC | Facility: CLINIC | Age: 42
End: 2022-11-19

## 2022-11-21 NOTE — TELEPHONE ENCOUNTER
From: Meet Hernadez  To: Celio Alfaro MD  Sent: 11/19/2022 2:44 PM CST  Subject: Ear pain after flu     Dr. Rocio Mcdonnell    I am writing to you because I have been experiencing left ear pain for the plast couple days. Is there a recommendation for an ear drop I can get?      David Guillen

## 2022-11-23 ENCOUNTER — HOSPITAL ENCOUNTER (OUTPATIENT)
Dept: MAMMOGRAPHY | Facility: HOSPITAL | Age: 42
Discharge: HOME | End: 2022-11-23
Attending: SURGERY
Payer: COMMERCIAL

## 2022-11-23 ENCOUNTER — HOSPITAL ENCOUNTER (OUTPATIENT)
Dept: MRI IMAGING | Facility: HOSPITAL | Age: 42
Discharge: HOME | End: 2022-11-23
Attending: SURGERY
Payer: COMMERCIAL

## 2022-11-23 DIAGNOSIS — R92.8 ABNORMAL MRI, BREAST: ICD-10-CM

## 2022-11-23 PROCEDURE — 19085 BX BREAST 1ST LESION MR IMAG: CPT | Performed by: SURGERY

## 2022-11-23 PROCEDURE — 88305 TISSUE EXAM BY PATHOLOGIST: CPT | Performed by: SURGERY

## 2022-11-23 PROCEDURE — A9575 INJ GADOTERATE MEGLUMI 0.1ML: HCPCS | Performed by: SURGERY

## 2022-11-23 PROCEDURE — 77065 DX MAMMO INCL CAD UNI: CPT | Performed by: SURGERY

## 2022-11-23 RX ORDER — GADOTERATE MEGLUMINE 376.9 MG/ML
15 INJECTION INTRAVENOUS
Status: COMPLETED | OUTPATIENT
Start: 2022-11-23 | End: 2022-11-23

## 2022-11-23 RX ADMIN — GADOTERATE MEGLUMINE 12 ML: 376.9 INJECTION INTRAVENOUS at 08:05:00

## 2022-11-24 RX ORDER — FLUTICASONE PROPIONATE 50 MCG
2 SPRAY, SUSPENSION (ML) NASAL DAILY
Qty: 1 EACH | Refills: 0 | Status: SHIPPED | OUTPATIENT
Start: 2022-11-24 | End: 2023-11-19

## 2022-11-28 DIAGNOSIS — R92.8 ABNORMAL MAMMOGRAM: Primary | ICD-10-CM

## 2022-11-28 RX ORDER — FLUTICASONE PROPIONATE 50 MCG
SPRAY, SUSPENSION (ML) NASAL
Qty: 48 G | Refills: 0 | OUTPATIENT
Start: 2022-11-28

## 2022-11-29 ENCOUNTER — PATIENT MESSAGE (OUTPATIENT)
Dept: FAMILY MEDICINE CLINIC | Facility: CLINIC | Age: 42
End: 2022-11-29

## 2022-11-30 ENCOUNTER — VIRTUAL PHONE E/M (OUTPATIENT)
Dept: FAMILY MEDICINE CLINIC | Facility: CLINIC | Age: 42
End: 2022-11-30
Payer: COMMERCIAL

## 2022-11-30 DIAGNOSIS — J98.01 ACUTE BRONCHOSPASM: ICD-10-CM

## 2022-11-30 DIAGNOSIS — Z20.822 CLOSE EXPOSURE TO COVID-19 VIRUS: Primary | ICD-10-CM

## 2022-11-30 DIAGNOSIS — R05.1 ACUTE COUGH: ICD-10-CM

## 2022-11-30 PROCEDURE — 99442 PHONE E/M BY PHYS 11-20 MIN: CPT | Performed by: FAMILY MEDICINE

## 2022-11-30 RX ORDER — BENZONATATE 200 MG/1
200 CAPSULE ORAL 3 TIMES DAILY PRN
Qty: 30 CAPSULE | Refills: 0 | Status: SHIPPED | OUTPATIENT
Start: 2022-11-30

## 2022-11-30 NOTE — TELEPHONE ENCOUNTER
From: Jorge Luis Ashby  To: Art Duncan MD  Sent: 11/19/2022 2:44 PM CST  Subject: Ear pain after flu     Dr. Marquette Dubin    I am writing to you because I have been experiencing left ear pain for the plast couple days. Is there a recommendation for an ear drop I can get?      Sary Cazares

## 2022-12-01 ENCOUNTER — PATIENT MESSAGE (OUTPATIENT)
Dept: FAMILY MEDICINE CLINIC | Facility: CLINIC | Age: 42
End: 2022-12-01

## 2022-12-05 ENCOUNTER — PATIENT MESSAGE (OUTPATIENT)
Dept: FAMILY MEDICINE CLINIC | Facility: CLINIC | Age: 42
End: 2022-12-05

## 2022-12-05 ENCOUNTER — TELEPHONE (OUTPATIENT)
Dept: FAMILY MEDICINE CLINIC | Facility: CLINIC | Age: 42
End: 2022-12-05

## 2022-12-05 ENCOUNTER — TELEMEDICINE (OUTPATIENT)
Dept: FAMILY MEDICINE CLINIC | Facility: CLINIC | Age: 42
End: 2022-12-05
Payer: COMMERCIAL

## 2022-12-05 DIAGNOSIS — T50.905A MEDICATION REACTION, INITIAL ENCOUNTER: Primary | ICD-10-CM

## 2022-12-05 DIAGNOSIS — U07.1 COVID-19: ICD-10-CM

## 2022-12-05 NOTE — TELEPHONE ENCOUNTER
Pt wife calling on behalf of Pt. Post covid, Pt is still very foggy, not remembering things. Pt is to return to work tomorrow. Pt just asking about returning to work. Pt also on other meds that may effect memory. This is the main concern, memory issues. Please advise Pt wife.

## 2022-12-05 NOTE — TELEPHONE ENCOUNTER
Future Appointments   Date Time Provider Yvan Logan   12/5/2022  4:00 PM ASHLEY Etienne EMG [address]   12/12/2022  4:00 PM ASHLEY Dickinson [facility] [place]   1/4/2023  9:00 AM [facility] [address] [facility] EEG [facility] AT [facility]   1/4/2023 11:00 AM [address] [facility] MR RM4 (3T WIDE) [address] [facility] MRI [facility]

## 2022-12-05 NOTE — TELEPHONE ENCOUNTER
Future Appointments   Date Time Provider Yvan Logan   12/5/2022  4:00 PM ASHLEY Peter EMG [address]   12/12/2022  4:00 PM ASHLEY Mina [facility] [place]   1/4/2023  9:00 AM [facility] [address] EEG Iman Haque   1/4/2023 11:00 AM [address] [facility] [address] MR RM4 (3T WIDE) [address] [facility] [address] MRI Iman Haque

## 2022-12-07 ENCOUNTER — TELEPHONE (OUTPATIENT)
Dept: FAMILY MEDICINE CLINIC | Facility: CLINIC | Age: 42
End: 2022-12-07

## 2022-12-07 NOTE — TELEPHONE ENCOUNTER
Half life is ~6 hrs so should be in the system for ~12 hr.  If she gets more foggy/lethargic, breathing is irregular, etc to consider taking her to the ER for evaluation.

## 2022-12-07 NOTE — TELEPHONE ENCOUNTER
HAILY with information and recommendation from Dr. Shahla Coronado below. Asked to call back with any questions, call back number provided.

## 2022-12-07 NOTE — TELEPHONE ENCOUNTER
Pt's wife called and said Kirsty Waldron was taking Paxlovid and was having problems with her memory (she was feeling foggy). Pt's wife said she has been dizzy and irritable and wants to know how long the Paxlovid will stay in her system? The pharmacy told her she shouldn't have any drug interactions but her psych doctor said the Paxlovid with her other meds is not a good match.

## 2022-12-08 ENCOUNTER — HOSPITAL ENCOUNTER (EMERGENCY)
Age: 42
Discharge: HOME | End: 2022-12-08
Attending: EMERGENCY MEDICINE
Payer: COMMERCIAL

## 2022-12-08 ENCOUNTER — TELEPHONE (OUTPATIENT)
Dept: FAMILY MEDICINE CLINIC | Facility: CLINIC | Age: 42
End: 2022-12-08

## 2022-12-08 ENCOUNTER — APPOINTMENT (OUTPATIENT)
Dept: CT IMAGING | Age: 42
End: 2022-12-08
Attending: EMERGENCY MEDICINE
Payer: COMMERCIAL

## 2022-12-08 VITALS
BODY MASS INDEX: 23.9 KG/M2 | HEART RATE: 59 BPM | SYSTOLIC BLOOD PRESSURE: 118 MMHG | TEMPERATURE: 99 F | DIASTOLIC BLOOD PRESSURE: 56 MMHG | HEIGHT: 64 IN | WEIGHT: 140 LBS | RESPIRATION RATE: 16 BRPM | OXYGEN SATURATION: 97 %

## 2022-12-08 DIAGNOSIS — R41.0 CONFUSION: Primary | ICD-10-CM

## 2022-12-08 LAB
ALBUMIN SERPL-MCNC: 4 G/DL (ref 3.4–5)
ALBUMIN/GLOB SERPL: 1.2 {RATIO} (ref 1–2)
ALP LIVER SERPL-CCNC: 77 U/L
ALT SERPL-CCNC: 31 U/L
ANION GAP SERPL CALC-SCNC: 5 MMOL/L (ref 0–18)
AST SERPL-CCNC: 25 U/L (ref 15–37)
B-HCG UR QL: NEGATIVE
BASOPHILS # BLD AUTO: 0.03 X10(3) UL (ref 0–0.2)
BASOPHILS NFR BLD AUTO: 0.4 %
BILIRUB SERPL-MCNC: 0.4 MG/DL (ref 0.1–2)
BILIRUB UR QL STRIP.AUTO: NEGATIVE
BUN BLD-MCNC: 13 MG/DL (ref 7–18)
CALCIUM BLD-MCNC: 9.2 MG/DL (ref 8.5–10.1)
CHLORIDE SERPL-SCNC: 105 MMOL/L (ref 98–112)
CLARITY UR REFRACT.AUTO: CLEAR
CO2 SERPL-SCNC: 28 MMOL/L (ref 21–32)
CREAT BLD-MCNC: 0.91 MG/DL
EOSINOPHIL # BLD AUTO: 0.08 X10(3) UL (ref 0–0.7)
EOSINOPHIL NFR BLD AUTO: 1.1 %
ERYTHROCYTE [DISTWIDTH] IN BLOOD BY AUTOMATED COUNT: 13.7 %
GFR SERPLBLD BASED ON 1.73 SQ M-ARVRAT: 81 ML/MIN/1.73M2 (ref 60–?)
GLOBULIN PLAS-MCNC: 3.3 G/DL (ref 2.8–4.4)
GLUCOSE BLD-MCNC: 88 MG/DL (ref 70–99)
GLUCOSE BLD-MCNC: 93 MG/DL (ref 70–99)
GLUCOSE UR STRIP.AUTO-MCNC: NEGATIVE MG/DL
HCT VFR BLD AUTO: 36.9 %
HGB BLD-MCNC: 12.7 G/DL
IMM GRANULOCYTES # BLD AUTO: 0.03 X10(3) UL (ref 0–1)
IMM GRANULOCYTES NFR BLD: 0.4 %
KETONES UR STRIP.AUTO-MCNC: NEGATIVE MG/DL
LEUKOCYTE ESTERASE UR QL STRIP.AUTO: NEGATIVE
LYMPHOCYTES # BLD AUTO: 2.45 X10(3) UL (ref 1–4)
LYMPHOCYTES NFR BLD AUTO: 32.8 %
MCH RBC QN AUTO: 30.6 PG (ref 26–34)
MCHC RBC AUTO-ENTMCNC: 34.4 G/DL (ref 31–37)
MCV RBC AUTO: 88.9 FL
MONOCYTES # BLD AUTO: 0.43 X10(3) UL (ref 0.1–1)
MONOCYTES NFR BLD AUTO: 5.8 %
NEUTROPHILS # BLD AUTO: 4.45 X10 (3) UL (ref 1.5–7.7)
NEUTROPHILS # BLD AUTO: 4.45 X10(3) UL (ref 1.5–7.7)
NEUTROPHILS NFR BLD AUTO: 59.5 %
NITRITE UR QL STRIP.AUTO: NEGATIVE
OSMOLALITY SERPL CALC.SUM OF ELEC: 286 MOSM/KG (ref 275–295)
PH UR STRIP.AUTO: 7 [PH] (ref 5–8)
PLATELET # BLD AUTO: 230 10(3)UL (ref 150–450)
POTASSIUM SERPL-SCNC: 4 MMOL/L (ref 3.5–5.1)
PROT SERPL-MCNC: 7.3 G/DL (ref 6.4–8.2)
PROT UR STRIP.AUTO-MCNC: NEGATIVE MG/DL
RBC # BLD AUTO: 4.15 X10(6)UL
RBC UR QL AUTO: NEGATIVE
SODIUM SERPL-SCNC: 138 MMOL/L (ref 136–145)
SP GR UR STRIP.AUTO: 1.01 (ref 1–1.03)
UROBILINOGEN UR STRIP.AUTO-MCNC: 0.2 MG/DL
WBC # BLD AUTO: 7.5 X10(3) UL (ref 4–11)

## 2022-12-08 PROCEDURE — 70498 CT ANGIOGRAPHY NECK: CPT | Performed by: EMERGENCY MEDICINE

## 2022-12-08 PROCEDURE — 80053 COMPREHEN METABOLIC PANEL: CPT | Performed by: EMERGENCY MEDICINE

## 2022-12-08 PROCEDURE — 85025 COMPLETE CBC W/AUTO DIFF WBC: CPT | Performed by: EMERGENCY MEDICINE

## 2022-12-08 PROCEDURE — 70496 CT ANGIOGRAPHY HEAD: CPT | Performed by: EMERGENCY MEDICINE

## 2022-12-08 PROCEDURE — 99284 EMERGENCY DEPT VISIT MOD MDM: CPT

## 2022-12-08 PROCEDURE — 99285 EMERGENCY DEPT VISIT HI MDM: CPT

## 2022-12-08 PROCEDURE — 81025 URINE PREGNANCY TEST: CPT

## 2022-12-08 PROCEDURE — 81003 URINALYSIS AUTO W/O SCOPE: CPT | Performed by: EMERGENCY MEDICINE

## 2022-12-08 PROCEDURE — 36415 COLL VENOUS BLD VENIPUNCTURE: CPT

## 2022-12-08 PROCEDURE — 82962 GLUCOSE BLOOD TEST: CPT

## 2022-12-08 PROCEDURE — 81003 URINALYSIS AUTO W/O SCOPE: CPT

## 2022-12-08 NOTE — ED INITIAL ASSESSMENT (HPI)
PT WAS DX W COVID 5 DAYS AGO AND STARTED ON PAXLOVID HASN'T BEEN THE SAME MENTALLY. VERY FORGETFUL, DIDN'T KNOW THE DAY OF THE WEEK.

## 2022-12-08 NOTE — TELEPHONE ENCOUNTER
Pt called and said she needs a RTW note stating days off starting on 11/16/22 thru 12/12/22. Her school wanted it backtracked to the 1st day she was sick and they will be able to approve her FMLA from that date. She would like it faxed to Attn: Sue Welsh fax: [phone].

## 2022-12-08 NOTE — TELEPHONE ENCOUNTER
Pt's wife is calling saying Ashwini Solitario does not look good. She is barely responding and she is wite. She thinks her meds may be off.

## 2022-12-09 ENCOUNTER — TELEPHONE (OUTPATIENT)
Dept: FAMILY MEDICINE CLINIC | Facility: CLINIC | Age: 42
End: 2022-12-09

## 2022-12-09 NOTE — TELEPHONE ENCOUNTER
Calling to let Dr. Susanna Granado know that she went to ER last night. What are the next steps? Do meds need to be changed? Would there be a time-what are the signs for pt that she would need to go to hospital for round the clock surveillance. Pt is very tired, very foggy and irritable-nothing harmful. It is believed that the symptoms were either caused by Covid or the Paxlovid as pt is on Xanax and Lamotrogene-and these may interact.     Do Dr. Susanna Granado and Corrine Eisenmenger, psychiatrist, need to speak in regards to care for pt?

## 2022-12-12 NOTE — TELEPHONE ENCOUNTER
Future Appointments   Date Time Provider Yvan Logan   12/12/2022  4:00 PM ASHLEY Lowe [facility] [place]   12/13/2022  7:20 AM Oriana Rodarte MD EMG [address]   1/4/2023  9:00 AM [facility] EEG Laura Claros   1/4/2023 11:00 AM  MR RM4 (3T WIDE) [facility] MRI Laura Claros

## 2022-12-21 ENCOUNTER — TELEPHONE (OUTPATIENT)
Dept: FAMILY MEDICINE CLINIC | Facility: CLINIC | Age: 42
End: 2022-12-21

## 2022-12-21 NOTE — TELEPHONE ENCOUNTER
Spoke with pt. She already takes alprazolam ER 2 mg every morning and has a 1 mg alprazolam to take as needed if she has high anxiety. She was given a medication in the past but is uncertain of what it was that really helped to knock her out in care if it was an oral or injection. She does not feel the regular alprazolam would be helpful. She is worried about herself moving during the procedure as she has involuntary movements. She is recently seen neurology and is evaluating her for Tourette's syndrome. She has had 2 reschedule an MRI procedure in the past due to moving too much. I asked her to call neurology to see what they recommend to help with involuntary movements and enable her to be successful in getting imaging done.

## 2022-12-21 NOTE — TELEPHONE ENCOUNTER
Patient has an MRI coming up, has high anxiety, would like to have something additional to take prior to. Would like to speak to someone clinical to discuss options, please advise.      Future Appointments   Date Time Provider Yvan Logan   1/4/2023  9:00 AM [address]   1/4/2023 11:00 AM  MR RM4 (3T WIDE) [facility] AT [facility]

## 2023-01-04 ENCOUNTER — NURSE ONLY (OUTPATIENT)
Dept: ELECTROPHYSIOLOGY | Facility: HOSPITAL | Age: 43
End: 2023-01-04
Attending: Other
Payer: COMMERCIAL

## 2023-01-04 ENCOUNTER — HOSPITAL ENCOUNTER (OUTPATIENT)
Dept: MRI IMAGING | Facility: HOSPITAL | Age: 43
Discharge: HOME | End: 2023-01-04
Attending: Other
Payer: COMMERCIAL

## 2023-01-04 ENCOUNTER — TELEPHONE (OUTPATIENT)
Dept: FAMILY MEDICINE CLINIC | Facility: CLINIC | Age: 43
End: 2023-01-04

## 2023-01-04 DIAGNOSIS — R41.3 MEMORY DEFICIT: ICD-10-CM

## 2023-01-04 PROCEDURE — 95812 EEG 41-60 MINUTES: CPT | Performed by: OTHER

## 2023-01-04 PROCEDURE — 70551 MRI BRAIN STEM W/O DYE: CPT | Performed by: OTHER

## 2023-01-04 NOTE — TELEPHONE ENCOUNTER
- Pt had EEG done and would like to know if she can get a call to discuss results when available.     Please call Yang 30. [phone]

## 2023-01-04 NOTE — PROCEDURES
Date of Procedure: 1/4/2023    Procedure: EEG (ELECTROENCEPHALOGRAM) 41-60 mins     35 Y/O F PRESENTS WITH MEMORY LOSS, SHORT TERM MEMORY ISSUES FOR SEVERAL YEARS. PT FEELS IT IS GETTING WORSE AS TIME GOES ON. SHE HAS DIFFICULTY WORD FINDING, MISPLACES ITEMS. PT Nayeli FEELS \"OUT OF BODY. \"  DENIES STARING SPELLS, CONVULSIONS, TONGUE BITE, INCONTINENCE,HX OF SZ, TBI. PMH: ANXIETY, BIPOLAR  RX: ALPRAZOLAM, ARIPIPRAZOLE, BENZTROPINE, BUSPIRONE, GABAPENTIN, LAMTRIGINE, PRAZOSIN, METHYLPHENIDATE    BACKGROUND ACTIVITY: Posterior rhythm was in the range of 8-10 Hz, reactive to eye opening; symmetrical and synchronous. Noted also are intermittent slowing of background activity. Drowsiness is characterized by intermittent theta waves bitemporally. Occasional sharp transients noted in posterior region. EPILEPTIFORM DISCHARGES: There were no epileptiform discharges or periodic lateralized epileptiform discharges (PLEDs) recorded throughout the recording. HYPERVENTILATION: Hyperventilation was performed with no change. PHOTIC STIMULATION: Photic stimulation was performed with no change. Stage II sleep was not reached. IMPRESSION: This is a normal awake and drowsy EEG. However, this does not rule out seizure disorder. Clinical correlation is advised.     Jesica Rubio MD   Neurology  [facility]  1/4/2023, 3:21 PM  CC: uYri Wren MD

## 2023-01-05 ENCOUNTER — TELEPHONE (OUTPATIENT)
Dept: SURGERY | Facility: CLINIC | Age: 43
End: 2023-01-05

## 2023-01-05 NOTE — TELEPHONE ENCOUNTER
Pt received and reviewed the results of her EEG on Spyra. She is requesting a call back at [phone] for someone to explain it to her. If more tests are need, she would like to complete them before she returns to work. Please advise. Endorsed to RNs.

## 2023-01-09 ENCOUNTER — TELEPHONE (OUTPATIENT)
Dept: FAMILY MEDICINE CLINIC | Facility: CLINIC | Age: 43
End: 2023-01-09

## 2023-01-09 NOTE — TELEPHONE ENCOUNTER
Spoke with pt, advised her that EEG was ordered by Dr. Esau Marie so she should discuss the results with him. Pt states she received results from Dr. Esau Marie and was told to follow up with her PCP for additional recommendations. EEG done 1/4/23  IMPRESSION: This is a normal awake and drowsy EEG. However, this does not rule out seizure disorder. Clinical correlation is advised.     Pt is going to keep her appt with Doris Sicard for tomorrow but if recommendations are to see neuro, she would like a new neurologist referral.

## 2023-01-09 NOTE — TELEPHONE ENCOUNTER
Future Appointments   Date Time Provider Yvan Doreen   1/10/2023  4:30 PM ASHLEY Marin EMG [address]   1/30/2023  3:30 PM ASHLEY Vanegas [address] [facility]     Patient wants to make sure this appointment is for answers, does not want to hear \"not sure\" as far as EEG results. Keeping the appointment but wanted to see Dr. Dell Ho, offered her the next available but she declined.

## 2023-01-10 ENCOUNTER — OFFICE VISIT (OUTPATIENT)
Dept: FAMILY MEDICINE CLINIC | Facility: CLINIC | Age: 43
End: 2023-01-10
Payer: COMMERCIAL

## 2023-01-10 VITALS
OXYGEN SATURATION: 98 % | HEART RATE: 87 BPM | RESPIRATION RATE: 16 BRPM | WEIGHT: 149 LBS | TEMPERATURE: 98 F | SYSTOLIC BLOOD PRESSURE: 108 MMHG | HEIGHT: 64 IN | BODY MASS INDEX: 25.44 KG/M2 | DIASTOLIC BLOOD PRESSURE: 78 MMHG

## 2023-01-10 DIAGNOSIS — R56.9 SEIZURE-LIKE ACTIVITY (HCC): Primary | ICD-10-CM

## 2023-01-10 DIAGNOSIS — F33.2 SEVERE EPISODE OF RECURRENT MAJOR DEPRESSIVE DISORDER, WITHOUT PSYCHOTIC FEATURES (HCC): ICD-10-CM

## 2023-01-10 PROCEDURE — 99214 OFFICE O/P EST MOD 30 MIN: CPT | Performed by: FAMILY MEDICINE

## 2023-01-10 PROCEDURE — 3008F BODY MASS INDEX DOCD: CPT | Performed by: FAMILY MEDICINE

## 2023-01-10 PROCEDURE — 3074F SYST BP LT 130 MM HG: CPT | Performed by: FAMILY MEDICINE

## 2023-01-10 PROCEDURE — 3078F DIAST BP <80 MM HG: CPT | Performed by: FAMILY MEDICINE

## 2023-01-23 ENCOUNTER — TELEPHONE (OUTPATIENT)
Dept: FAMILY MEDICINE CLINIC | Facility: CLINIC | Age: 43
End: 2023-01-23

## 2023-01-23 NOTE — TELEPHONE ENCOUNTER
Future Appointments   Date Time Provider Yvan Doreen   1/26/2023  3:00 PM Festus Cotto MD EMG [address]   1/30/2023  3:30 PM ASHLEY Gruber [address]     See TE 1/20/23. Will VV be acceptable to address this issue? Please send to front staff if needs to be rescheduled.

## 2023-01-26 ENCOUNTER — OFFICE VISIT (OUTPATIENT)
Dept: FAMILY MEDICINE CLINIC | Facility: CLINIC | Age: 43
End: 2023-01-26
Payer: COMMERCIAL

## 2023-01-26 ENCOUNTER — HOSPITAL ENCOUNTER (OUTPATIENT)
Dept: GENERAL RADIOLOGY | Age: 43
Discharge: HOME | End: 2023-01-26
Attending: FAMILY MEDICINE
Payer: COMMERCIAL

## 2023-01-26 VITALS
BODY MASS INDEX: 24.41 KG/M2 | RESPIRATION RATE: 16 BRPM | DIASTOLIC BLOOD PRESSURE: 70 MMHG | HEART RATE: 80 BPM | OXYGEN SATURATION: 99 % | TEMPERATURE: 98 F | SYSTOLIC BLOOD PRESSURE: 130 MMHG | WEIGHT: 143 LBS | HEIGHT: 64 IN

## 2023-01-26 DIAGNOSIS — M25.511 ACUTE PAIN OF RIGHT SHOULDER: ICD-10-CM

## 2023-01-26 DIAGNOSIS — M25.511 ACUTE PAIN OF RIGHT SHOULDER: Primary | ICD-10-CM

## 2023-01-26 PROCEDURE — 73030 X-RAY EXAM OF SHOULDER: CPT | Performed by: FAMILY MEDICINE

## 2023-01-26 PROCEDURE — 99213 OFFICE O/P EST LOW 20 MIN: CPT | Performed by: FAMILY MEDICINE

## 2023-01-26 PROCEDURE — 3075F SYST BP GE 130 - 139MM HG: CPT | Performed by: FAMILY MEDICINE

## 2023-01-26 PROCEDURE — 3008F BODY MASS INDEX DOCD: CPT | Performed by: FAMILY MEDICINE

## 2023-01-26 PROCEDURE — 3078F DIAST BP <80 MM HG: CPT | Performed by: FAMILY MEDICINE

## 2023-01-26 RX ORDER — MELOXICAM 15 MG/1
15 TABLET ORAL DAILY PRN
Qty: 60 TABLET | Refills: 0 | Status: SHIPPED | OUTPATIENT
Start: 2023-01-26 | End: 2023-01-26

## 2023-01-26 RX ORDER — MELOXICAM 15 MG/1
15 TABLET ORAL DAILY PRN
Qty: 90 TABLET | Refills: 0 | Status: SHIPPED | OUTPATIENT
Start: 2023-01-26

## 2023-01-26 NOTE — PATIENT INSTRUCTIONS
Max dose of Tylenol (Acetaminophen)  Max/dose- 1000mg/d  Max/day- 3000mg/d    Advised her she has to listen to her body. Whatever activity she does that hurts her, she should not do it.

## 2023-01-26 NOTE — TELEPHONE ENCOUNTER
Interaction between Fluoxitine and Meloxicam.  Toxic effects may be increased concurrent administration of NSAIDS and selective serotonin reuptake inhibitors. Patient is requesting a 90 day.

## 2023-01-27 ENCOUNTER — PATIENT MESSAGE (OUTPATIENT)
Dept: FAMILY MEDICINE CLINIC | Facility: CLINIC | Age: 43
End: 2023-01-27

## 2023-01-27 DIAGNOSIS — M25.511 ACUTE PAIN OF RIGHT SHOULDER: Primary | ICD-10-CM

## 2023-01-30 NOTE — TELEPHONE ENCOUNTER
From: Sudhir Huston  To: Rian Bella MD  Sent: 1/27/2023 5:20 PM CST  Subject: PT for rotator cuff    Hi! I have an [facility] that is across the street from our house in [place] [address]. Can I start my PT there? Do I need a referral to go there? Thanks for all you do!   Norma Dillard

## 2023-02-05 ENCOUNTER — PATIENT MESSAGE (OUTPATIENT)
Dept: FAMILY MEDICINE CLINIC | Facility: CLINIC | Age: 43
End: 2023-02-05

## 2023-02-06 ENCOUNTER — TELEMEDICINE (OUTPATIENT)
Dept: FAMILY MEDICINE CLINIC | Facility: CLINIC | Age: 43
End: 2023-02-06
Payer: COMMERCIAL

## 2023-02-06 ENCOUNTER — TELEPHONE (OUTPATIENT)
Dept: FAMILY MEDICINE CLINIC | Facility: CLINIC | Age: 43
End: 2023-02-06

## 2023-02-06 DIAGNOSIS — K52.9 GASTROENTERITIS: ICD-10-CM

## 2023-02-06 DIAGNOSIS — K52.9 GASTROENTERITIS: Primary | ICD-10-CM

## 2023-02-06 RX ORDER — DICYCLOMINE HYDROCHLORIDE 10 MG/1
10 CAPSULE ORAL 4 TIMES DAILY
Qty: 30 CAPSULE | Refills: 0 | Status: SHIPPED | OUTPATIENT
Start: 2023-02-06 | End: 2023-02-06

## 2023-02-06 RX ORDER — DICYCLOMINE HYDROCHLORIDE 10 MG/1
10 CAPSULE ORAL 4 TIMES DAILY
Qty: 30 CAPSULE | Refills: 0 | Status: SHIPPED | OUTPATIENT
Start: 2023-02-06 | End: 2023-02-16

## 2023-02-06 NOTE — TELEPHONE ENCOUNTER
Spouse calling about prescription sent to pharmacy. Dicyclomine 10 MG Oral Cap only available in higher dose, not the 10mg as prescribed. Spouse was informed other [facility] in [place] would have medication available.  Pharmacy list updated, please advise

## 2023-02-06 NOTE — TELEPHONE ENCOUNTER
Patient has appt today  Future Appointments   Date Time Provider Yvan Doreen   2/6/2023  2:40 PM Chad Sun MD EMG [address]   3/8/2023  5:00 PM Raul Quick, APRN [address] [facility]

## 2023-02-09 ENCOUNTER — TELEPHONE (OUTPATIENT)
Dept: FAMILY MEDICINE CLINIC | Facility: CLINIC | Age: 43
End: 2023-02-09

## 2023-02-09 NOTE — TELEPHONE ENCOUNTER
Spoke to patient. Was diagnosed with stomach flu on 2/6. Patient went back to work today and all of a sudden in later afternoon- developed ST, body aches, cough,  fever. Patient has very hoarse voice. States she has intermittent SOB. Fever has not gone above 100. Advised patient to go to ER if she has SOB or sx worsen. Advised to push fluids, hot shower, humidifier, throat lozenge, tylenol/ibuprofen prn. Patient verbalized understanding. Scheduled appt for tomorrow.    Future Appointments   Date Time Provider Yvan Logan   2/10/2023 10:45 AM ASHLEY Hill EMG [address]   3/8/2023  5:00 PM Ana Paula Quick, ASHLEY [address] [facility]

## 2023-02-09 NOTE — TELEPHONE ENCOUNTER
- Pt had a VV on 02/06/2023 and is getting worse. Body aches, Fever, Coughing and has gone into her chest,  Shortness of breath and Sore and scratchy throat. Patient is still taking to rx   dicyclomine 10 MG Oral Cap 30 capsule 0 2/6/2023 2/16/2023    Sig - Route:  Take 1 capsule (10 mg total) by mouth 4 (four) times daily for 10 days. - Oral          Please advise Yang 30. [phone]

## 2023-02-10 ENCOUNTER — TELEMEDICINE (OUTPATIENT)
Dept: FAMILY MEDICINE CLINIC | Facility: CLINIC | Age: 43
End: 2023-02-10
Payer: COMMERCIAL

## 2023-02-10 DIAGNOSIS — J01.10 ACUTE NON-RECURRENT FRONTAL SINUSITIS: ICD-10-CM

## 2023-02-10 DIAGNOSIS — R05.1 ACUTE COUGH: ICD-10-CM

## 2023-02-10 DIAGNOSIS — J40 BRONCHITIS WITH ACUTE WHEEZING: Primary | ICD-10-CM

## 2023-02-10 DIAGNOSIS — J02.9 PHARYNGITIS, UNSPECIFIED ETIOLOGY: ICD-10-CM

## 2023-02-10 PROCEDURE — 99213 OFFICE O/P EST LOW 20 MIN: CPT | Performed by: FAMILY MEDICINE

## 2023-02-10 RX ORDER — LIDOCAINE HYDROCHLORIDE 20 MG/ML
5 SOLUTION OROPHARYNGEAL 2 TIMES DAILY
Qty: 100 ML | Refills: 0 | Status: SHIPPED | OUTPATIENT
Start: 2023-02-10 | End: 2023-02-20

## 2023-02-10 RX ORDER — AMOXICILLIN AND CLAVULANATE POTASSIUM 875; 125 MG/1; MG/1
1 TABLET, FILM COATED ORAL 2 TIMES DAILY
Qty: 20 TABLET | Refills: 0 | Status: SHIPPED | OUTPATIENT
Start: 2023-02-10 | End: 2023-02-20

## 2023-02-10 RX ORDER — DEXTROMETHORPHAN HYDROBROMIDE AND PROMETHAZINE HYDROCHLORIDE 15; 6.25 MG/5ML; MG/5ML
5 SYRUP ORAL 4 TIMES DAILY PRN
Qty: 100 ML | Refills: 0 | Status: SHIPPED | OUTPATIENT
Start: 2023-02-10 | End: 2023-02-15

## 2023-02-10 RX ORDER — ALBUTEROL SULFATE 90 UG/1
2 AEROSOL, METERED RESPIRATORY (INHALATION) EVERY 4 HOURS PRN
Qty: 8 G | Refills: 0 | Status: SHIPPED | OUTPATIENT
Start: 2023-02-10

## 2023-02-10 RX ORDER — PREDNISONE 20 MG/1
20 TABLET ORAL 2 TIMES DAILY
Qty: 10 TABLET | Refills: 0 | Status: SHIPPED | OUTPATIENT
Start: 2023-02-10 | End: 2023-02-15

## 2023-02-13 ENCOUNTER — TELEPHONE (OUTPATIENT)
Dept: FAMILY MEDICINE CLINIC | Facility: CLINIC | Age: 43
End: 2023-02-13

## 2023-02-13 RX ORDER — AZITHROMYCIN 250 MG/1
TABLET, FILM COATED ORAL
Qty: 6 TABLET | Refills: 0 | Status: SHIPPED | OUTPATIENT
Start: 2023-02-13 | End: 2023-02-18

## 2023-02-13 NOTE — TELEPHONE ENCOUNTER
Patient states Sb Barragan put her on a couple of medications, she has had diarrhea since, wondering if the meds could be changed, please advise.

## 2023-04-01 ENCOUNTER — PATIENT MESSAGE (OUTPATIENT)
Dept: FAMILY MEDICINE CLINIC | Facility: CLINIC | Age: 43
End: 2023-04-01

## 2023-04-04 ENCOUNTER — TELEMEDICINE (OUTPATIENT)
Dept: FAMILY MEDICINE CLINIC | Facility: CLINIC | Age: 43
End: 2023-04-04
Payer: COMMERCIAL

## 2023-04-04 DIAGNOSIS — G43.009 MIGRAINE WITHOUT AURA AND WITHOUT STATUS MIGRAINOSUS, NOT INTRACTABLE: Primary | ICD-10-CM

## 2023-04-04 PROCEDURE — 99214 OFFICE O/P EST MOD 30 MIN: CPT | Performed by: FAMILY MEDICINE

## 2023-04-04 RX ORDER — SUMATRIPTAN 25 MG/1
25 TABLET, FILM COATED ORAL EVERY 2 HOUR PRN
Qty: 9 TABLET | Refills: 1 | Status: SHIPPED | OUTPATIENT
Start: 2023-04-04

## 2023-04-21 ENCOUNTER — ORDER TRANSCRIPTION (OUTPATIENT)
Dept: ADMINISTRATIVE | Facility: HOSPITAL | Age: 43
End: 2023-04-21

## 2023-04-21 DIAGNOSIS — M24.811: Primary | ICD-10-CM

## 2023-04-24 ENCOUNTER — TELEMEDICINE (OUTPATIENT)
Dept: FAMILY MEDICINE CLINIC | Facility: CLINIC | Age: 43
End: 2023-04-24
Payer: COMMERCIAL

## 2023-04-24 DIAGNOSIS — J02.0 PHARYNGITIS DUE TO STREPTOCOCCUS SPECIES: Primary | ICD-10-CM

## 2023-04-24 RX ORDER — LIDOCAINE HYDROCHLORIDE 20 MG/ML
5 SOLUTION OROPHARYNGEAL 2 TIMES DAILY
Qty: 100 ML | Refills: 0 | Status: SHIPPED | OUTPATIENT
Start: 2023-04-24 | End: 2023-05-04

## 2023-04-24 RX ORDER — METHYLPREDNISOLONE 4 MG/1
TABLET ORAL
Qty: 21 EACH | Refills: 0 | Status: SHIPPED | OUTPATIENT
Start: 2023-04-24

## 2023-05-08 ENCOUNTER — OFFICE VISIT (OUTPATIENT)
Dept: FAMILY MEDICINE CLINIC | Facility: CLINIC | Age: 43
End: 2023-05-08
Payer: COMMERCIAL

## 2023-05-08 ENCOUNTER — LAB ENCOUNTER (OUTPATIENT)
Dept: LAB | Age: 43
End: 2023-05-08
Attending: FAMILY MEDICINE
Payer: COMMERCIAL

## 2023-05-08 VITALS
RESPIRATION RATE: 16 BRPM | SYSTOLIC BLOOD PRESSURE: 100 MMHG | OXYGEN SATURATION: 97 % | DIASTOLIC BLOOD PRESSURE: 70 MMHG | BODY MASS INDEX: 25.27 KG/M2 | WEIGHT: 148 LBS | HEART RATE: 74 BPM | TEMPERATURE: 97 F | HEIGHT: 64 IN

## 2023-05-08 DIAGNOSIS — Z01.811 PRE-OP CHEST EXAM: Primary | ICD-10-CM

## 2023-05-08 DIAGNOSIS — Z01.811 PRE-OP CHEST EXAM: ICD-10-CM

## 2023-05-08 DIAGNOSIS — G43.009 MIGRAINE WITHOUT AURA AND WITHOUT STATUS MIGRAINOSUS, NOT INTRACTABLE: ICD-10-CM

## 2023-05-08 LAB
ALBUMIN SERPL-MCNC: 3.7 G/DL (ref 3.4–5)
ALBUMIN/GLOB SERPL: 1.1 {RATIO} (ref 1–2)
ALP LIVER SERPL-CCNC: 91 U/L
ALT SERPL-CCNC: 30 U/L
ANION GAP SERPL CALC-SCNC: 0 MMOL/L (ref 0–18)
AST SERPL-CCNC: 27 U/L (ref 15–37)
ATRIAL RATE: 59 BPM
BASOPHILS # BLD AUTO: 0.04 X10(3) UL (ref 0–0.2)
BASOPHILS NFR BLD AUTO: 0.5 %
BILIRUB SERPL-MCNC: 0.5 MG/DL (ref 0.1–2)
BUN BLD-MCNC: 14 MG/DL (ref 7–18)
CALCIUM BLD-MCNC: 9.4 MG/DL (ref 8.5–10.1)
CHLORIDE SERPL-SCNC: 107 MMOL/L (ref 98–112)
CO2 SERPL-SCNC: 29 MMOL/L (ref 21–32)
CREAT BLD-MCNC: 0.86 MG/DL
EOSINOPHIL # BLD AUTO: 0.08 X10(3) UL (ref 0–0.7)
EOSINOPHIL NFR BLD AUTO: 0.9 %
ERYTHROCYTE [DISTWIDTH] IN BLOOD BY AUTOMATED COUNT: 13.3 %
FASTING STATUS PATIENT QL REPORTED: YES
GFR SERPLBLD BASED ON 1.73 SQ M-ARVRAT: 86 ML/MIN/1.73M2 (ref 60–?)
GLOBULIN PLAS-MCNC: 3.4 G/DL (ref 2.8–4.4)
GLUCOSE BLD-MCNC: 76 MG/DL (ref 70–99)
HCT VFR BLD AUTO: 39.8 %
HGB BLD-MCNC: 13 G/DL
IMM GRANULOCYTES # BLD AUTO: 0.04 X10(3) UL (ref 0–1)
IMM GRANULOCYTES NFR BLD: 0.5 %
LYMPHOCYTES # BLD AUTO: 1.38 X10(3) UL (ref 1–4)
LYMPHOCYTES NFR BLD AUTO: 16.1 %
MCH RBC QN AUTO: 30.2 PG (ref 26–34)
MCHC RBC AUTO-ENTMCNC: 32.7 G/DL (ref 31–37)
MCV RBC AUTO: 92.3 FL
MONOCYTES # BLD AUTO: 0.63 X10(3) UL (ref 0.1–1)
MONOCYTES NFR BLD AUTO: 7.3 %
NEUTROPHILS # BLD AUTO: 6.42 X10 (3) UL (ref 1.5–7.7)
NEUTROPHILS # BLD AUTO: 6.42 X10(3) UL (ref 1.5–7.7)
NEUTROPHILS NFR BLD AUTO: 74.7 %
OSMOLALITY SERPL CALC.SUM OF ELEC: 281 MOSM/KG (ref 275–295)
P AXIS: 20 DEGREES
P-R INTERVAL: 176 MS
PLATELET # BLD AUTO: 252 10(3)UL (ref 150–450)
POTASSIUM SERPL-SCNC: 4.1 MMOL/L (ref 3.5–5.1)
PROT SERPL-MCNC: 7.1 G/DL (ref 6.4–8.2)
Q-T INTERVAL: 378 MS
QRS DURATION: 78 MS
QTC CALCULATION (BEZET): 374 MS
R AXIS: 78 DEGREES
RBC # BLD AUTO: 4.31 X10(6)UL
SODIUM SERPL-SCNC: 136 MMOL/L (ref 136–145)
T AXIS: 33 DEGREES
VENTRICULAR RATE: 59 BPM
WBC # BLD AUTO: 8.6 X10(3) UL (ref 4–11)

## 2023-05-08 PROCEDURE — 85025 COMPLETE CBC W/AUTO DIFF WBC: CPT

## 2023-05-08 PROCEDURE — 36415 COLL VENOUS BLD VENIPUNCTURE: CPT

## 2023-05-08 PROCEDURE — 80053 COMPREHEN METABOLIC PANEL: CPT

## 2023-05-08 RX ORDER — SUMATRIPTAN 25 MG/1
25 TABLET, FILM COATED ORAL EVERY 2 HOUR PRN
Qty: 9 TABLET | Refills: 1 | Status: SHIPPED | OUTPATIENT
Start: 2023-05-08

## 2023-05-10 ENCOUNTER — TELEPHONE (OUTPATIENT)
Dept: FAMILY MEDICINE CLINIC | Facility: CLINIC | Age: 43
End: 2023-05-10

## 2023-05-10 NOTE — TELEPHONE ENCOUNTER
Pt is requesting the visit notes for any visits related to her shoulder injury. Pt needs these for her [facility] documentaion. Saw Dr. Tania Eaton 1/26/23 for this issue. Visit notes can be faxed to [phone], missy Brooke. [place] [address] office ph [phone]. Pt is having shoulder surgery 5/15/23. Pre-op completed by Ursula RAMIREZ  5/8/23.

## 2023-07-13 ENCOUNTER — LAB ENCOUNTER (OUTPATIENT)
Dept: LAB | Age: 43
End: 2023-07-13
Attending: NURSE PRACTITIONER
Payer: COMMERCIAL

## 2023-07-13 ENCOUNTER — EKG ENCOUNTER (OUTPATIENT)
Dept: LAB | Age: 43
End: 2023-07-13
Attending: NURSE PRACTITIONER
Payer: COMMERCIAL

## 2023-07-13 DIAGNOSIS — F31.9 BIPOLAR AFFECTIVE DISORDER, REMISSION STATUS UNSPECIFIED (HCC): ICD-10-CM

## 2023-07-13 DIAGNOSIS — F42.9 OBSESSIVE-COMPULSIVE DISORDER, UNSPECIFIED TYPE: ICD-10-CM

## 2023-07-13 DIAGNOSIS — F43.10 POSTTRAUMATIC STRESS DISORDER: ICD-10-CM

## 2023-07-13 DIAGNOSIS — F90.0 ATTENTION DEFICIT HYPERACTIVITY DISORDER (ADHD), PREDOMINANTLY INATTENTIVE TYPE: ICD-10-CM

## 2023-07-13 DIAGNOSIS — F41.1 GENERALIZED ANXIETY DISORDER: ICD-10-CM

## 2023-07-13 LAB
ALBUMIN SERPL-MCNC: 4 G/DL (ref 3.4–5)
ALBUMIN/GLOB SERPL: 1.1 {RATIO} (ref 1–2)
ALP LIVER SERPL-CCNC: 86 U/L
ALT SERPL-CCNC: 32 U/L
AMPHET UR QL SCN: NEGATIVE
ANION GAP SERPL CALC-SCNC: 8 MMOL/L (ref 0–18)
APAP SERPL-MCNC: <2 UG/ML (ref 10–30)
AST SERPL-CCNC: 34 U/L (ref 15–37)
ATRIAL RATE: 57 BPM
BARBITURATES UR QL SCN: NEGATIVE
BASOPHILS # BLD AUTO: 0.03 X10(3) UL (ref 0–0.2)
BASOPHILS NFR BLD AUTO: 0.6 %
BILIRUB SERPL-MCNC: 0.4 MG/DL (ref 0.1–2)
BUN BLD-MCNC: 17 MG/DL (ref 7–18)
CALCIUM BLD-MCNC: 9.6 MG/DL (ref 8.5–10.1)
CANNABINOIDS UR QL SCN: NEGATIVE
CHLORIDE SERPL-SCNC: 106 MMOL/L (ref 98–112)
CO2 SERPL-SCNC: 25 MMOL/L (ref 21–32)
COCAINE UR QL: NEGATIVE
CREAT BLD-MCNC: 0.86 MG/DL
CREAT UR-SCNC: 39.9 MG/DL
EOSINOPHIL # BLD AUTO: 0.06 X10(3) UL (ref 0–0.7)
EOSINOPHIL NFR BLD AUTO: 1.2 %
ERYTHROCYTE [DISTWIDTH] IN BLOOD BY AUTOMATED COUNT: 14 %
ETHANOL UR-MCNC: NEGATIVE MG/DL
FASTING STATUS PATIENT QL REPORTED: NO
GFR SERPLBLD BASED ON 1.73 SQ M-ARVRAT: 86 ML/MIN/1.73M2 (ref 60–?)
GLOBULIN PLAS-MCNC: 3.8 G/DL (ref 2.8–4.4)
GLUCOSE BLD-MCNC: 82 MG/DL (ref 70–99)
HCT VFR BLD AUTO: 38.9 %
HGB BLD-MCNC: 12.7 G/DL
IMM GRANULOCYTES # BLD AUTO: 0.01 X10(3) UL (ref 0–1)
IMM GRANULOCYTES NFR BLD: 0.2 %
LYMPHOCYTES # BLD AUTO: 1.76 X10(3) UL (ref 1–4)
LYMPHOCYTES NFR BLD AUTO: 33.8 %
MCH RBC QN AUTO: 30 PG (ref 26–34)
MCHC RBC AUTO-ENTMCNC: 32.6 G/DL (ref 31–37)
MCV RBC AUTO: 91.7 FL
MONOCYTES # BLD AUTO: 0.53 X10(3) UL (ref 0.1–1)
MONOCYTES NFR BLD AUTO: 10.2 %
NEUTROPHILS # BLD AUTO: 2.81 X10 (3) UL (ref 1.5–7.7)
NEUTROPHILS # BLD AUTO: 2.81 X10(3) UL (ref 1.5–7.7)
NEUTROPHILS NFR BLD AUTO: 54 %
OPIATES UR QL SCN: NEGATIVE
OSMOLALITY SERPL CALC.SUM OF ELEC: 289 MOSM/KG (ref 275–295)
P AXIS: 29 DEGREES
P-R INTERVAL: 196 MS
PCP UR QL SCN: NEGATIVE
PLATELET # BLD AUTO: 227 10(3)UL (ref 150–450)
POTASSIUM SERPL-SCNC: 4.2 MMOL/L (ref 3.5–5.1)
PROT SERPL-MCNC: 7.8 G/DL (ref 6.4–8.2)
Q-T INTERVAL: 382 MS
QRS DURATION: 80 MS
QTC CALCULATION (BEZET): 371 MS
R AXIS: 73 DEGREES
RBC # BLD AUTO: 4.24 X10(6)UL
SODIUM SERPL-SCNC: 139 MMOL/L (ref 136–145)
T AXIS: 38 DEGREES
VENTRICULAR RATE: 57 BPM
WBC # BLD AUTO: 5.2 X10(3) UL (ref 4–11)

## 2023-07-13 PROCEDURE — 36415 COLL VENOUS BLD VENIPUNCTURE: CPT

## 2023-07-13 PROCEDURE — 85025 COMPLETE CBC W/AUTO DIFF WBC: CPT

## 2023-07-13 PROCEDURE — 93010 ELECTROCARDIOGRAM REPORT: CPT | Performed by: INTERNAL MEDICINE

## 2023-07-13 PROCEDURE — 93005 ELECTROCARDIOGRAM TRACING: CPT

## 2023-07-13 PROCEDURE — 80143 DRUG ASSAY ACETAMINOPHEN: CPT

## 2023-07-13 PROCEDURE — 80053 COMPREHEN METABOLIC PANEL: CPT

## 2023-07-13 PROCEDURE — 80307 DRUG TEST PRSMV CHEM ANLYZR: CPT

## 2023-08-24 ENCOUNTER — TELEPHONE (OUTPATIENT)
Dept: FAMILY MEDICINE CLINIC | Facility: CLINIC | Age: 43
End: 2023-08-24

## 2023-08-24 NOTE — TELEPHONE ENCOUNTER
Patient calling, patient  at school. Patient had several PE classes yesterday, felt school-gym area was hot and humid. Patient started feeling sick from heat, patient left early today still with dizziness,nausea. No VV appointments available for today.

## 2023-08-24 NOTE — TELEPHONE ENCOUNTER
Called pt. to see what was going on and how she felt. 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 ...dizziness, low grade temp, weakness, nausea and vomiting. Mccarthy Riding after getting home rested and took it easy and replaced fluids and ate. .felt a little better. ..but today woke up with a headache, but thought ok to go to school anyway. Mccarthy Riding once she got there she  started to feeling dizzy and came home. ..now asking what she can do., no VV available. Encouraged her to stay inside cool environment, replace electrolytes with Gatorade or even Pedialyte, eat simple carbs to gain some energy and rest, not to exert herself today and if still not feeling better tomorrow to call office and update us. Mccarthy Riding Mccarthy Riding She verbalizes understanding of info, to call with any concerns. To ER if condition worsens.

## 2023-09-10 ENCOUNTER — OFFICE VISIT (OUTPATIENT)
Dept: URGENT CARE | Age: 43
End: 2023-09-10

## 2023-09-10 VITALS
HEART RATE: 79 BPM | DIASTOLIC BLOOD PRESSURE: 68 MMHG | OXYGEN SATURATION: 99 % | WEIGHT: 150 LBS | BODY MASS INDEX: 25.61 KG/M2 | TEMPERATURE: 98.3 F | HEIGHT: 64 IN | SYSTOLIC BLOOD PRESSURE: 108 MMHG | RESPIRATION RATE: 16 BRPM

## 2023-09-10 DIAGNOSIS — J02.0 STREP PHARYNGITIS: ICD-10-CM

## 2023-09-10 DIAGNOSIS — J06.9 ACUTE URI: Primary | ICD-10-CM

## 2023-09-10 LAB
FLUAV AG UPPER RESP QL IA.RAPID: NEGATIVE
FLUBV AG UPPER RESP QL IA.RAPID: NEGATIVE
INTERNAL PROCEDURAL CONTROLS ACCEPTABLE: YES
INTERNAL PROCEDURAL CONTROLS ACCEPTABLE: YES
S PYO AG THROAT QL IA.RAPID: POSITIVE
TEST LOT EXPIRATION DATE: ABNORMAL
TEST LOT EXPIRATION DATE: NORMAL
TEST LOT NUMBER: ABNORMAL
TEST LOT NUMBER: NORMAL

## 2023-09-10 PROCEDURE — 99203 OFFICE O/P NEW LOW 30 MIN: CPT | Performed by: NURSE PRACTITIONER

## 2023-09-10 PROCEDURE — 87635 SARS-COV-2 COVID-19 AMP PRB: CPT | Performed by: INTERNAL MEDICINE

## 2023-09-10 PROCEDURE — 87804 INFLUENZA ASSAY W/OPTIC: CPT | Performed by: NURSE PRACTITIONER

## 2023-09-10 PROCEDURE — 87880 STREP A ASSAY W/OPTIC: CPT | Performed by: NURSE PRACTITIONER

## 2023-09-10 RX ORDER — DEXTROAMPHETAMINE SACCHARATE, AMPHETAMINE ASPARTATE, DEXTROAMPHETAMINE SULFATE AND AMPHETAMINE SULFATE 2.5; 2.5; 2.5; 2.5 MG/1; MG/1; MG/1; MG/1
10 TABLET ORAL DAILY
COMMUNITY

## 2023-09-10 RX ORDER — BUDESONIDE AND FORMOTEROL FUMARATE DIHYDRATE 160; 4.5 UG/1; UG/1
2 AEROSOL RESPIRATORY (INHALATION) 2 TIMES DAILY
COMMUNITY
Start: 2022-11-16

## 2023-09-10 RX ORDER — ALPRAZOLAM 1 MG/1
TABLET ORAL
COMMUNITY
Start: 2023-07-13

## 2023-09-10 RX ORDER — METHYLPHENIDATE HYDROCHLORIDE 27 MG/1
27 TABLET, EXTENDED RELEASE ORAL
COMMUNITY

## 2023-09-10 RX ORDER — BENZTROPINE MESYLATE 0.5 MG/1
0.5 TABLET ORAL
COMMUNITY
Start: 2022-03-23

## 2023-09-10 RX ORDER — BUSPIRONE HYDROCHLORIDE 5 MG/1
5 TABLET ORAL 3 TIMES DAILY
COMMUNITY

## 2023-09-10 RX ORDER — ARIPIPRAZOLE 2 MG/1
TABLET ORAL
COMMUNITY
Start: 2020-07-01

## 2023-09-10 RX ORDER — FLUOXETINE HYDROCHLORIDE 40 MG/1
1 CAPSULE ORAL DAILY
COMMUNITY
Start: 2022-03-23

## 2023-09-10 RX ORDER — CELECOXIB 200 MG/1
CAPSULE ORAL
COMMUNITY
Start: 2023-07-20

## 2023-09-10 RX ORDER — BUSPIRONE HYDROCHLORIDE 30 MG/1
30 TABLET ORAL 3 TIMES DAILY
COMMUNITY
Start: 2023-09-03

## 2023-09-10 RX ORDER — ALPRAZOLAM 2 MG/1
1 TABLET, EXTENDED RELEASE ORAL EVERY MORNING
COMMUNITY
Start: 2022-03-23

## 2023-09-10 RX ORDER — ARIPIPRAZOLE 10 MG/1
TABLET ORAL
COMMUNITY
Start: 2023-09-04

## 2023-09-10 RX ORDER — PRAZOSIN HYDROCHLORIDE 1 MG/1
CAPSULE ORAL
COMMUNITY
Start: 2023-07-11

## 2023-09-10 RX ORDER — ALBUTEROL SULFATE 90 UG/1
2 AEROSOL, METERED RESPIRATORY (INHALATION) EVERY 4 HOURS PRN
Qty: 1 EACH | Refills: 0 | Status: SHIPPED | OUTPATIENT
Start: 2023-09-10 | End: 2023-09-15

## 2023-09-10 RX ORDER — ALBUTEROL SULFATE 90 UG/1
2 AEROSOL, METERED RESPIRATORY (INHALATION) EVERY 4 HOURS PRN
COMMUNITY
Start: 2012-02-05

## 2023-09-10 RX ORDER — AMOXICILLIN 500 MG/1
500 CAPSULE ORAL 2 TIMES DAILY
Qty: 20 CAPSULE | Refills: 0 | Status: SHIPPED | OUTPATIENT
Start: 2023-09-10 | End: 2023-09-20

## 2023-09-10 RX ORDER — SUMATRIPTAN 25 MG/1
25 TABLET, FILM COATED ORAL
COMMUNITY
Start: 2023-04-04

## 2023-09-10 RX ORDER — LAMOTRIGINE 200 MG/1
TABLET ORAL
COMMUNITY
Start: 2023-08-13

## 2023-09-10 RX ORDER — METHYLPHENIDATE HYDROCHLORIDE 54 MG/1
54 TABLET ORAL
COMMUNITY
Start: 2022-03-31 | End: 2023-09-13

## 2023-09-10 RX ORDER — ALPRAZOLAM 2 MG/1
2 TABLET, EXTENDED RELEASE ORAL EVERY MORNING
COMMUNITY
Start: 2023-08-12

## 2023-09-10 RX ORDER — GABAPENTIN 800 MG/1
TABLET ORAL
COMMUNITY
Start: 2023-09-03

## 2023-09-10 RX ORDER — BENZONATATE 100 MG/1
100 CAPSULE ORAL 3 TIMES DAILY PRN
Qty: 15 CAPSULE | Refills: 0 | Status: SHIPPED | OUTPATIENT
Start: 2023-09-10 | End: 2023-09-15

## 2023-09-10 ASSESSMENT — PAIN SCALES - GENERAL: PAINLEVEL: 0

## 2023-09-11 LAB
SARS-COV-2 RNA RESP QL NAA+PROBE: NOT DETECTED
SERVICE CMNT-IMP: NORMAL
SERVICE CMNT-IMP: NORMAL

## 2023-09-25 ENCOUNTER — TELEPHONE (OUTPATIENT)
Dept: FAMILY MEDICINE CLINIC | Facility: CLINIC | Age: 43
End: 2023-09-25

## 2023-09-25 NOTE — TELEPHONE ENCOUNTER
Patient Called to ask about no show for appointment on 9/25/2023. Can be rescheduled or seen if needed.

## 2023-10-18 ENCOUNTER — TELEMEDICINE (OUTPATIENT)
Dept: FAMILY MEDICINE CLINIC | Facility: CLINIC | Age: 43
End: 2023-10-18
Payer: COMMERCIAL

## 2023-10-18 ENCOUNTER — TELEPHONE (OUTPATIENT)
Dept: FAMILY MEDICINE CLINIC | Facility: CLINIC | Age: 43
End: 2023-10-18

## 2023-10-18 DIAGNOSIS — R05.1 ACUTE COUGH: Primary | ICD-10-CM

## 2023-10-18 DIAGNOSIS — J40 BRONCHITIS WITH ACUTE WHEEZING: ICD-10-CM

## 2023-10-18 PROCEDURE — 99213 OFFICE O/P EST LOW 20 MIN: CPT | Performed by: FAMILY MEDICINE

## 2023-10-18 RX ORDER — ALBUTEROL SULFATE 2.5 MG/3ML
2.5 SOLUTION RESPIRATORY (INHALATION) EVERY 4 HOURS PRN
Qty: 75 ML | Refills: 3 | Status: SHIPPED | OUTPATIENT
Start: 2023-10-18

## 2023-10-18 RX ORDER — ALBUTEROL SULFATE 90 UG/1
2 AEROSOL, METERED RESPIRATORY (INHALATION) EVERY 4 HOURS PRN
Qty: 1 EACH | Refills: 0 | Status: SHIPPED | OUTPATIENT
Start: 2023-10-18

## 2023-10-18 RX ORDER — PREDNISONE 20 MG/1
20 TABLET ORAL 2 TIMES DAILY
Qty: 10 TABLET | Refills: 0 | Status: SHIPPED | OUTPATIENT
Start: 2023-10-18 | End: 2023-10-23

## 2023-10-18 NOTE — TELEPHONE ENCOUNTER
Mi Braden- Can note for work be faxed to employer? Pt had VV today and needs note for work. Fax [phone] [facility].

## 2023-10-20 NOTE — PROGRESS NOTES
This is a telemedicine visit with live, interactive video and/or audio. Patient understands and accepts financial responsibility for any deductible, co-insurance and/or co-pays associated with this service. SUBJECTIVE  This is a 36year old female evaluation of a sinus pressure, congestion, & sinus pain. Onset was 5 . The sinus pressure and congrestion has been on going and getting worse. Treatments tried OTC sinus medications. Thick mucus, sinus pressure and sinus pain. Denies current fevers, chills, lung disease, asthma, copd, weight loss,  smoking, heart disease. Sick contacts: Denies. Recent travel: Denies Exposure to TB: Denies. Has been on antibiotics but feels like the cough is getting worse.        Results for orders placed or performed in visit on 07/13/23   EKG -External   Result Value Ref Range    Ventricular rate 57 BPM    Atrial rate 57 BPM    P-R Interval 196 ms    QRS Duration 80 ms    Q-T Interval 382 ms    QTC Calculation (Bezet) 371 ms    P Axis 29 degrees    R Axis 73 degrees    T Axis 38 degrees       HISTORY:  Past Medical History:   Diagnosis Date    Abdominal pain     ADD (attention deficit disorder) 7/24/2013    ADHD 2011    Anxiety     Atypical lobular hyperplasia (ALH) of left breast 08/31/2022    Back pain     Belching     Bipolar affective (HCC)     Bipolar disorder ([facility] [address] 7/2/2013    Bloating     Constipation     Depression     Diarrhea, unspecified     Dysmenorrhea     Fatigue     Feeling lonely     Fibroids     Frequent use of laxatives     Generalized anxiety disorder 6/21/2011    History of depression     History of mental disorder     History of suicide attempt     x 3 (overdose on meds, 2020- CO poisoning)    IFG (impaired fasting glucose) 3/15/2022    Irregular bowel habits     Left ovarian cyst     Leiomyoma of uterus, unspecified 3/17/2014    Loss of appetite     Nausea     Non-seasonal allergic rhinitis due to pollen 2/15/2018    OAB (overactive bladder)     OCD (obsessive compulsive disorder)     OCD--started on Effexor and referral to therapist--sees Una Barker---[phone]---taking prazosin for bad dreams    OCD (obsessive compulsive disorder)     OCD--started on medication and referral to therapist    Panic attacks     thought to have asthma--but it was panic attacks and SOB--PFT's see media-is WNL, off Asthma meds since early 2011    Problems with swallowing     PTSD (post-traumatic stress disorder)     Severe episode of recurrent major depressive disorder, without psychotic features ([facility] [address] 10/25/2011    Stress     Uncomfortable fullness after meals     Wears glasses       Past Surgical History:   Procedure Laterality Date    BREAST BIOPSY Left 11/23/2022    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 Isis Weiss NDL/CATH [facility] DX/THER [place]  04/25/2014    Procedure: TRANSFORAMINAL EPIDURAL - LUMBAR;  Surgeon: Nael Claire MD;  Location: [address] [place] [address] FOR PAIN MANAGEMENT    HAND/FINGER SURGERY UNLISTED Right     INJECTION, W/WO CONTRAST, DX/THERAPEUTIC SUBSTANCE, EPIDURAL/SUBARACHNOID; LUMBAR/SACRAL  04/25/2014    Procedure: TRANSFORAMINAL EPIDURAL - LUMBAR;  Surgeon: Nael Claire MD;  Location: [address] [place] [address] PAIN MANAGEMENT    M-SEDAJ BY [facility] [address] [address]  [address] SVC 5+ YR  04/25/2014    Procedure: TRANSFORAMINAL EPIDURAL - LUMBAR;  Surgeon: Nael Claire MD;  Location: [address] [place] [address] PAIN MANAGEMENT    SHOULDER ARTHROSCOPY Right 05/15/2023    SLAP repair    US BREAST BIOPSY Left 08/31/2022    WISDOM TEETH REMOVED        Family History   Problem Relation Age of Onset    Hypertension Father     PTSD Father     Skin cancer Father 61        Melanoma    Other (Other) Father     Diabetes Mother     Hypertension Mother     Cancer Brother         testicular cancer    Bipolar Disorder Brother     Other (Other) Brother       Social History     Socioeconomic History    Marital status:    Tobacco Use Smoking status: Never    Smokeless tobacco: Never   Vaping Use    Vaping Use: Never used   Substance and Sexual Activity    Alcohol use: Not Currently     Comment: socially    Drug use: No    Sexual activity: Not Currently     Partners: Female   Other Topics Concern    Caffeine Concern Yes     Comment: coffee     Exercise Yes     Comment: 5-6 x week, weights, running    Seat Belt Yes   Social History Narrative    EtOH--1 drink 1x/week or 1 drink every 2 weeks        No Known Allergies   Current Outpatient Medications   Medication Sig Dispense Refill    predniSONE 20 MG Oral Tab Take 1 tablet (20 mg total) by mouth 2 (two) times daily for 5 days. 10 tablet 0    albuterol 108 (90 Base) MCG/ACT Inhalation Aero Soln Inhale 2 puffs into the lungs every 4 (four) hours as needed for Wheezing or Shortness of Breath (cough). 1 each 0    albuterol (2.5 MG/3ML) 0.083% Inhalation Nebu Soln Take 3 mL (2.5 mg total) by nebulization every 4 (four) hours as needed for Wheezing or Shortness of Breath. 75 mL 3    ALPRAZolam ER 2 MG Oral Tablet 24 Hr TAKE 1 TABLET BY MOUTH EVERY MORNING 30 tablet 0    methylphenidate ER (CONCERTA) 54 MG Oral Tab CR Take 1 tablet (54 mg total) by mouth every morning. 30 tablet 0    gabapentin 800 MG Oral Tab Take 1 tablet (800 mg total) by mouth 3 (three) times daily. Take with 100 mg for a total dose of 900 mg three times a day. 270 tablet 0    gabapentin 100 MG Oral Cap TAKE 1 CAPSULE BY MOUTH THREE TIMES DAILY TAKE WITH 800 MG FOR A TOTAL DOSE  MG THREE TIMES DAILY 270 capsule 0    FLUoxetine HCl 40 MG Oral Cap Take 1 capsule (40 mg total) by mouth daily. 90 capsule 1    busPIRone HCl 30 MG Oral Tab Take 1 tablet (30 mg total) by mouth 3 (three) times daily. 270 tablet 0    ARIPiprazole 10 MG Oral Tab Take 1 tablet (10 mg total) by mouth daily.  30 tablet 2    HYDROcodone-acetaminophen 5-325 MG Oral Tab TAKE 1 TO 2 TABLETS BY MOUTH EVERY 6 HOURS FOR UP TO 7 DAYS AS NEEDED FOR SEVERE PAIN ALPRAZolam 1 MG Oral Tab TAKE 1 TABLET BY MOUTH IN THE AFTERNOON AS NEEDED FOR ANXIETY 30 tablet 0    lamoTRIgine 200 MG Oral Tab TAKE 1 TABLET BY MOUTH IN THE MORNING AND TAKE 1 TABLET BY MOUTH AT DINNER 180 tablet 1    SUMAtriptan (IMITREX) 25 MG Oral Tab Take 1 tablet (25 mg total) by mouth every 2 (two) hours as needed for Migraine. Use at onset; repeat once after 2 HRS-ONLY 2 IN 24 HR MAX 9 tablet 1    Meloxicam 15 MG Oral Tab Take 1 tablet (15 mg total) by mouth daily as needed (R shoulder pain). Take with food 90 tablet 0    fluticasone propionate 50 MCG/ACT Nasal Suspension 2 sprays by Each Nare route daily. 1 each 0    fluticasone-salmeterol (ADVAIR DISKUS) 100-50 MCG/ACT Inhalation Aerosol Powder, Breath Activated Inhale 1 puff into the lungs 2 (two) times daily. 60 each 0    albuterol 108 (90 Base) MCG/ACT Inhalation Aero Soln Inhale 2 puffs into the lungs every 4 (four) hours as needed for Wheezing. 1 each 0    Budesonide-Formoterol Fumarate (SYMBICORT) 160-4.5 MCG/ACT Inhalation Aerosol Inhale 2 puffs into the lungs 2 (two) times daily. 1 each 0       OBJECTIVE  Physical Exam:   Speaking in full sentences comfortably and Normal work of breathing    ASSESSMENT & PLAN  Diagnoses and all orders for this visit:    Acute cough  -     predniSONE 20 MG Oral Tab; Take 1 tablet (20 mg total) by mouth 2 (two) times daily for 5 days. -     albuterol 108 (90 Base) MCG/ACT Inhalation Aero Soln; Inhale 2 puffs into the lungs every 4 (four) hours as needed for Wheezing or Shortness of Breath (cough). -     albuterol (2.5 MG/3ML) 0.083% Inhalation Nebu Soln; Take 3 mL (2.5 mg total) by nebulization every 4 (four) hours as needed for Wheezing or Shortness of Breath. Bronchitis with acute wheezing  -     predniSONE 20 MG Oral Tab; Take 1 tablet (20 mg total) by mouth 2 (two) times daily for 5 days. -     albuterol 108 (90 Base) MCG/ACT Inhalation Aero Soln;  Inhale 2 puffs into the lungs every 4 (four) hours as needed for Wheezing or Shortness of Breath (cough). -     albuterol (2.5 MG/3ML) 0.083% Inhalation Nebu Soln; Take 3 mL (2.5 mg total) by nebulization every 4 (four) hours as needed for Wheezing or Shortness of Breath. Recommended increased fluids/humidity  12-hour decongestant nasal spray twice daily for a maximum of 4 days  Steam inhalation for sinus pressure   OTC cold and flu medication as needed. Robitussin or Robitussin DM as needed for cough  1 tsp honey for the cough prn    Tylenol as needed/Ibuprofen as needed, do not exceed 4000 mg of acetaminophen or 2400 mg of ibuprofen    Recheck if not improved in one week or sooner if worsening. Follow Up:  No follow-ups on file. Plan discussed with patient. All questions answered. Patient is agreeable to this plan of care. Time spent on telephone call and encounter 10 min.        ASHLEY Amaya

## 2023-10-23 ENCOUNTER — HOSPITAL ENCOUNTER (OUTPATIENT)
Dept: MAMMOGRAPHY | Facility: HOSPITAL | Age: 43
Discharge: HOME | End: 2023-10-23
Attending: SURGERY

## 2023-10-23 DIAGNOSIS — R92.8 ABNORMAL MAMMOGRAM: ICD-10-CM

## 2023-10-23 PROCEDURE — 77066 DX MAMMO INCL CAD BI: CPT | Performed by: SURGERY

## 2023-10-23 PROCEDURE — 76642 ULTRASOUND BREAST LIMITED: CPT | Performed by: SURGERY

## 2023-10-23 PROCEDURE — 77062 BREAST TOMOSYNTHESIS BI: CPT | Performed by: SURGERY

## 2023-10-26 ENCOUNTER — TELEMEDICINE (OUTPATIENT)
Dept: FAMILY MEDICINE CLINIC | Facility: CLINIC | Age: 43
End: 2023-10-26

## 2023-10-26 DIAGNOSIS — R11.0 NAUSEA: ICD-10-CM

## 2023-10-26 DIAGNOSIS — R05.1 ACUTE COUGH: ICD-10-CM

## 2023-10-26 DIAGNOSIS — B34.9 ACUTE VIRAL SYNDROME: Primary | ICD-10-CM

## 2023-10-26 PROCEDURE — 99213 OFFICE O/P EST LOW 20 MIN: CPT | Performed by: FAMILY MEDICINE

## 2023-10-26 RX ORDER — BENZONATATE 200 MG/1
200 CAPSULE ORAL 3 TIMES DAILY PRN
Qty: 21 CAPSULE | Refills: 0 | Status: SHIPPED | OUTPATIENT
Start: 2023-10-26

## 2023-10-26 RX ORDER — ONDANSETRON 4 MG/1
4 TABLET, ORALLY DISINTEGRATING ORAL EVERY 8 HOURS PRN
Qty: 10 TABLET | Refills: 0 | Status: SHIPPED | OUTPATIENT
Start: 2023-10-26

## 2023-10-26 NOTE — PROGRESS NOTES
Video Visit- [place] [address]  This is a telemedicine visit with live, interactive video and audio. Sagar Patterson understands and accepts financial responsibility for any deductible, co-insurance and/or co-pays associated with this service for the date of 10/26/23. Duration of the service: 11 minutes  3:08-3:19PM    Patient presents with:  Cough       HPI:  Cough- was sent home from work b/c not feeling well. Took a breathing tx this AM and coughed for 30min straight, but then started getting nauseous    Sxs started: 2 d ago. Sxs include: cough, nausea, tired, loose stools, myalgia, no appetite. Was sick on 10/18 and saw Phu Turk who erxed albuterol and prednisone. ?Got Augmentin prior to 10/18 visit? Monserrat Howard Finished Augmentin and prednisone~2-3 d ago. + albuterol inhaler helps some, but the neb helps better. Taking ibuprofen 2# and tylenol 2# this AM, and 1 more of one of them this afternoon. No sinus pressure. No known h/o asthma, but says she gets bronchitis and needs an albuterol inhaler ~2x/yr. Last covid test ~2wks ago neg. No flu shot yet this year. 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     Denies- fever, chills, chest pain, runny/stuffy nose, sore throat, loss of taste/smell, headaches, vomiting, diarrhea            Past Medical History:   Diagnosis Date    Abdominal pain     ADD (attention deficit disorder) 07/24/2013    ADHD 2011    Anxiety     Atypical lobular hyperplasia (ALH) of left breast 08/31/2022    Back pain     Belching     Bipolar affective ([facility] [address]     Bipolar disorder ([facility] [address] 07/02/2013    Bloating     Constipation     Depression     Diarrhea, unspecified     Dysmenorrhea     Fatigue     Feeling lonely     Fibroids     Frequent use of laxatives     Generalized anxiety disorder 06/21/2011    History of depression     History of suicide attempt     x 3 (overdose on meds, 2020- CO poisoning)    IFG (impaired fasting glucose) 03/15/2022    Irregular bowel habits     Left ovarian cyst     Leiomyoma of uterus, unspecified 03/17/2014    Loss of appetite     Nausea     Non-seasonal allergic rhinitis due to pollen 02/15/2018    OAB (overactive bladder)     OCD (obsessive compulsive disorder)     OCD--started on Effexor and referral to therapist--sees Una Barker---[phone]---taking prazosin for bad dreams    OCD (obsessive compulsive disorder)     OCD--started on medication and referral to therapist    Panic attacks     thought to have asthma--but it was panic attacks and SOB--PFT's see media-is WNL, off Asthma meds since early 2011    Problems with swallowing     PTSD (post-traumatic stress disorder)     RAD (reactive airway disease)     bronchitis needs an albuterol inhaler ~2x/yr.     Severe episode of recurrent major depressive disorder, without psychotic features ([facility] [address] 10/25/2011    Uncomfortable fullness after meals     Wears glasses        Past Surgical History:   Procedure Laterality Date    BREAST BIOPSY Left 11/23/2022    pseudo-angiomatous stromal hyperplasia ([place] [facility])    [place]  08/10/2018    Dr. Colt Cox NDL/CATH [place] DX/THER [facility]  04/25/2014    Procedure: TRANSFORAMINAL EPIDURAL - LUMBAR;  Surgeon: Carlos Chau MD;  Location: [facility] FOR PAIN MANAGEMENT    HAND/FINGER SURGERY UNLISTED Right     INJECTION, W/WO CONTRAST, DX/THERAPEUTIC SUBSTANCE, EPIDURAL/SUBARACHNOID; LUMBAR/SACRAL  04/25/2014    Procedure: TRANSFORAMINAL EPIDURAL - LUMBAR;  Surgeon: Carlos Chau MD;  Location: [facility] PAIN MANAGEMENT    M-SEDAJ BY  PH[facility] [id number] [address] [facility]  [address] [facility] 5+ YR  04/25/2014    Procedure: TRANSFORAMINAL EPIDURAL - LUMBAR;  Surgeon: Carlos Chau MD;  Location: [address] [place] [address] BIOPSY STEREO NODULE 1 SITE LEFT (CPT=19081)      SHOULDER ARTHROSCOPY Right 05/15/2023    SLAP repair    US BREAST BIOPSY Left 08/31/2022    WISDOM TEETH REMOVED         Family History   Problem Relation Age of Onset Hypertension Father     PTSD Father     Skin cancer Father 61        Melanoma    Other (Other) Father     Diabetes Mother     Hypertension Mother     Cancer Brother         testicular cancer    Bipolar Disorder Brother     Other (Other) Brother            Physical Exam:  LMP 10/09/2023 (Approximate)     GENERAL APPEARANCE:  Alert, appears uncomfortable, closes eyes at times, appears stated age  HEENT:  NCAT, EOMI, mucus membranes moist  NECK:  No obvious goiter  PULMONARY:  Speaking in full sentences comfortably, normal work of breathing, intermittent coughing  ABDOMEN:  not obese  MUSCULOSKELETAL: Sitting upright. NEUROLOGIC:  Cranial nerves 2-12 grossly intact. PSYCHIATRIC:  Normal mood, affect, and hygiene. Assessment/Plan:  1. Acute viral syndrome  - ondansetron 4 MG Oral Tablet Dispersible; Take 1 tablet (4 mg total) by mouth every 8 (eight) hours as needed for Nausea. Dispense: 10 tablet; Refill: 0  - benzonatate 200 MG Oral Cap; Take 1 capsule (200 mg total) by mouth 3 (three) times daily as needed for cough. Dispense: 21 capsule; Refill: 0    2. Nausea  - ondansetron 4 MG Oral Tablet Dispersible; Take 1 tablet (4 mg total) by mouth every 8 (eight) hours as needed for Nausea. Dispense: 10 tablet; Refill: 0    3. Acute cough  - benzonatate 200 MG Oral Cap; Take 1 capsule (200 mg total) by mouth 3 (three) times daily as needed for cough. Dispense: 21 capsule; Refill: 0   -advised her to take a home covid test, call me if positive  -if spike a fever to consider testing for flu/covid/rsv  -rest, hydration, humidifier  -meds above for sxs  -cont tylenol/ibuprofen per directions prn myalgias/fever  -letter given to excuse her from work today and tomorrow. Return for if symptoms worsen/don't improve. Edmund Gonzales MD      Please note that the following visit was completed using two-way, real-time interactive audio and visual communication.  This has been done in good ann-marie to provide continuity of care in the best interest of the provider-patient relationship, due to the ongoing public health crisis/national emergency and because of restrictions of visitation. There are limitations of this visit as physical examination was limited. Appropriate medical decision-making and tests are ordered as detailed in the plan of care above.

## 2023-11-01 ENCOUNTER — HOSPITAL ENCOUNTER (OUTPATIENT)
Dept: GENERAL RADIOLOGY | Age: 43
Discharge: HOME | End: 2023-11-01
Attending: FAMILY MEDICINE
Payer: COMMERCIAL

## 2023-11-01 ENCOUNTER — OFFICE VISIT (OUTPATIENT)
Dept: FAMILY MEDICINE CLINIC | Facility: CLINIC | Age: 43
End: 2023-11-01
Payer: COMMERCIAL

## 2023-11-01 VITALS
OXYGEN SATURATION: 99 % | HEART RATE: 83 BPM | HEIGHT: 64 IN | SYSTOLIC BLOOD PRESSURE: 110 MMHG | DIASTOLIC BLOOD PRESSURE: 70 MMHG | TEMPERATURE: 98 F | WEIGHT: 155 LBS | RESPIRATION RATE: 18 BRPM | BODY MASS INDEX: 26.46 KG/M2

## 2023-11-01 DIAGNOSIS — J42 CHRONIC BRONCHITIS WITH WHEEZING (HCC): Primary | ICD-10-CM

## 2023-11-01 DIAGNOSIS — J98.01 ACUTE BRONCHOSPASM: ICD-10-CM

## 2023-11-01 PROCEDURE — 3074F SYST BP LT 130 MM HG: CPT | Performed by: FAMILY MEDICINE

## 2023-11-01 PROCEDURE — 3078F DIAST BP <80 MM HG: CPT | Performed by: FAMILY MEDICINE

## 2023-11-01 PROCEDURE — 3008F BODY MASS INDEX DOCD: CPT | Performed by: FAMILY MEDICINE

## 2023-11-01 PROCEDURE — 99214 OFFICE O/P EST MOD 30 MIN: CPT | Performed by: FAMILY MEDICINE

## 2023-11-01 PROCEDURE — 71046 X-RAY EXAM CHEST 2 VIEWS: CPT | Performed by: FAMILY MEDICINE

## 2023-11-01 RX ORDER — METHYLPREDNISOLONE 4 MG/1
TABLET ORAL
Qty: 21 EACH | Refills: 0 | Status: SHIPPED | OUTPATIENT
Start: 2023-11-01

## 2023-11-01 RX ORDER — MONTELUKAST SODIUM 10 MG/1
10 TABLET ORAL NIGHTLY
Qty: 90 TABLET | Refills: 0 | Status: SHIPPED | OUTPATIENT
Start: 2023-11-01 | End: 2024-01-30

## 2023-11-01 RX ORDER — FLUTICASONE PROPIONATE AND SALMETEROL 500; 50 UG/1; UG/1
1 POWDER RESPIRATORY (INHALATION) 2 TIMES DAILY
Qty: 14 EACH | Refills: 1 | Status: SHIPPED | OUTPATIENT
Start: 2023-11-01 | End: 2023-12-01

## 2023-11-01 RX ORDER — DEXTROMETHORPHAN HYDROBROMIDE AND PROMETHAZINE HYDROCHLORIDE 15; 6.25 MG/5ML; MG/5ML
5 SYRUP ORAL 4 TIMES DAILY PRN
Qty: 100 ML | Refills: 0 | Status: SHIPPED | OUTPATIENT
Start: 2023-11-01 | End: 2023-11-06

## 2023-11-03 ENCOUNTER — OFFICE VISIT (OUTPATIENT)
Facility: CLINIC | Age: 43
End: 2023-11-03
Payer: COMMERCIAL

## 2023-11-03 VITALS
BODY MASS INDEX: 25.95 KG/M2 | HEART RATE: 95 BPM | HEIGHT: 64 IN | DIASTOLIC BLOOD PRESSURE: 80 MMHG | RESPIRATION RATE: 16 BRPM | WEIGHT: 152 LBS | OXYGEN SATURATION: 98 % | SYSTOLIC BLOOD PRESSURE: 122 MMHG

## 2023-11-03 DIAGNOSIS — J42 CHRONIC BRONCHITIS, UNSPECIFIED CHRONIC BRONCHITIS TYPE (HCC): Primary | ICD-10-CM

## 2023-11-03 DIAGNOSIS — R06.09 DYSPNEA ON EXERTION: ICD-10-CM

## 2023-11-03 DIAGNOSIS — R05.3 CHRONIC COUGH: ICD-10-CM

## 2023-11-03 PROCEDURE — 3074F SYST BP LT 130 MM HG: CPT | Performed by: NURSE PRACTITIONER

## 2023-11-03 PROCEDURE — 99204 OFFICE O/P NEW MOD 45 MIN: CPT | Performed by: NURSE PRACTITIONER

## 2023-11-03 PROCEDURE — 3079F DIAST BP 80-89 MM HG: CPT | Performed by: NURSE PRACTITIONER

## 2023-11-03 PROCEDURE — 3008F BODY MASS INDEX DOCD: CPT | Performed by: NURSE PRACTITIONER

## 2023-11-03 NOTE — PATIENT INSTRUCTIONS
Continue Advair, albuterol nebs, singulair and steroids as prescribed  Rest, push fluids and obtain labs and breathing test in 1 month  Followup with Dr Savanna Murphy in 1 month  Please contact office at [phone] with any decline in respiratory status, questions or concerns

## 2023-12-08 ENCOUNTER — OFFICE VISIT (OUTPATIENT)
Facility: CLINIC | Age: 43
End: 2023-12-08
Payer: COMMERCIAL

## 2023-12-08 VITALS
BODY MASS INDEX: 26.46 KG/M2 | HEART RATE: 72 BPM | HEIGHT: 64 IN | DIASTOLIC BLOOD PRESSURE: 72 MMHG | WEIGHT: 155 LBS | OXYGEN SATURATION: 99 % | RESPIRATION RATE: 16 BRPM | SYSTOLIC BLOOD PRESSURE: 114 MMHG

## 2023-12-08 DIAGNOSIS — J45.40 MODERATE PERSISTENT ASTHMA WITHOUT COMPLICATION: Primary | ICD-10-CM

## 2023-12-08 DIAGNOSIS — J30.9 ALLERGIC RHINITIS, UNSPECIFIED SEASONALITY, UNSPECIFIED TRIGGER: ICD-10-CM

## 2023-12-08 PROCEDURE — 3074F SYST BP LT 130 MM HG: CPT | Performed by: INTERNAL MEDICINE

## 2023-12-08 PROCEDURE — 3078F DIAST BP <80 MM HG: CPT | Performed by: INTERNAL MEDICINE

## 2023-12-08 PROCEDURE — 3008F BODY MASS INDEX DOCD: CPT | Performed by: INTERNAL MEDICINE

## 2023-12-08 PROCEDURE — 99214 OFFICE O/P EST MOD 30 MIN: CPT | Performed by: INTERNAL MEDICINE

## 2023-12-08 RX ORDER — MONTELUKAST SODIUM 10 MG/1
10 TABLET ORAL NIGHTLY
Qty: 90 TABLET | Refills: 3 | Status: SHIPPED | OUTPATIENT
Start: 2023-12-08 | End: 2024-12-02

## 2023-12-08 RX ORDER — FLUTICASONE PROPIONATE AND SALMETEROL XINAFOATE 230; 21 UG/1; UG/1
2 AEROSOL, METERED RESPIRATORY (INHALATION) 2 TIMES DAILY
Qty: 12 G | Refills: 3 | Status: SHIPPED | OUTPATIENT
Start: 2023-12-08

## 2023-12-08 NOTE — PATIENT INSTRUCTIONS
Start using nasal rinses (navage, neilmed rinse, netipot or similar) with distilled water at least once a day but can use twice a day if needed. These can be found at your local pharmacy or [employer]. After using the nasal rinse, then use a nasal steroid such as flonase, nasocort, nasonex or similar medication. You can get generic nasal steroids. Use this for at least two weeks to assess for any improvement.

## 2023-12-27 ENCOUNTER — PATIENT MESSAGE (OUTPATIENT)
Dept: FAMILY MEDICINE CLINIC | Facility: CLINIC | Age: 43
End: 2023-12-27

## 2024-01-08 ENCOUNTER — TELEPHONE (OUTPATIENT)
Facility: CLINIC | Age: 44
End: 2024-01-08

## 2024-02-05 ENCOUNTER — TELEMEDICINE (OUTPATIENT)
Dept: FAMILY MEDICINE CLINIC | Facility: CLINIC | Age: 44
End: 2024-02-05
Payer: COMMERCIAL

## 2024-02-05 DIAGNOSIS — J02.9 PHARYNGITIS, UNSPECIFIED ETIOLOGY: ICD-10-CM

## 2024-02-05 DIAGNOSIS — Z12.31 BREAST CANCER SCREENING BY MAMMOGRAM: Primary | ICD-10-CM

## 2024-02-05 DIAGNOSIS — J01.10 ACUTE NON-RECURRENT FRONTAL SINUSITIS: ICD-10-CM

## 2024-02-05 PROCEDURE — 99214 OFFICE O/P EST MOD 30 MIN: CPT | Performed by: FAMILY MEDICINE

## 2024-02-05 RX ORDER — AMOXICILLIN AND CLAVULANATE POTASSIUM 875; 125 MG/1; MG/1
1 TABLET, FILM COATED ORAL 2 TIMES DAILY
Qty: 20 TABLET | Refills: 0 | Status: SHIPPED | OUTPATIENT
Start: 2024-02-05 | End: 2024-02-15

## 2024-02-05 RX ORDER — PREDNISONE 20 MG/1
20 TABLET ORAL 2 TIMES DAILY
Qty: 10 TABLET | Refills: 0 | Status: SHIPPED | OUTPATIENT
Start: 2024-02-05 | End: 2024-02-10

## 2024-02-06 NOTE — PROGRESS NOTES
This is a telemedicine visit with live, interactive video and/or audio.     Patient understands and accepts financial responsibility for any deductible, co-insurance and/or co-pays associated with this service.    SUBJECTIVE  This is a 43 year old female female who presents for evaluation of a sinus pressure, congestion, & sinus pain.  Onset was 3 days ago . The sinus pressure and congrestion has been on going and getting worse. Treatments tried OTC sinus medications and rx from [facility]. Thick mucus, sinus pressure and sinus pain. Denies current fevers, chills, lung disease, asthma, copd, weight loss,  smoking, heart disease. Sick contacts: children from school, patient is a teacher. Recent travel: Denies Exposure to TB: Denies.    Results for orders placed or performed in visit on 07/13/23   EKG -External   Result Value Ref Range    Ventricular rate 57 BPM    Atrial rate 57 BPM    P-R Interval 196 ms    QRS Duration 80 ms    Q-T Interval 382 ms    QTC Calculation (Bezet) 371 ms    P Axis 29 degrees    R Axis 73 degrees    T Axis 38 degrees       HISTORY:  Past Medical History:   Diagnosis Date    Abdominal pain     ADD (attention deficit disorder) 07/24/2013    ADHD 2011    Anxiety     Atypical lobular hyperplasia (ALH) of left breast 08/31/2022    Back pain     Belching     Bipolar affective (HCC)     Bipolar disorder (HCC) 07/02/2013    Bloating     Constipation     Depression     Diarrhea, unspecified     Dysmenorrhea     Fatigue     Feeling lonely     Fibroids     Frequent use of laxatives     Generalized anxiety disorder 06/21/2011    History of depression     History of suicide attempt     x 3 (overdose on meds, 2020- CO poisoning)    IFG (impaired fasting glucose) 03/15/2022    Irregular bowel habits     Left ovarian cyst     Leiomyoma of uterus, unspecified 03/17/2014    Loss of appetite     Nausea     Non-seasonal allergic rhinitis due to pollen 02/15/2018    OAB (overactive bladder)     OCD  (obsessive compulsive disorder)     OCD--started on Effexor and referral to therapist--sees Una Dahl---[phone]---taking prazosin for bad dreams    OCD (obsessive compulsive disorder)     OCD--started on medication and referral to therapist    Panic attacks     thought to have asthma--but it was panic attacks and SOB--PFT's see media-is WNL, off Asthma meds since early 2011    Problems with swallowing     PTSD (post-traumatic stress disorder)     RAD (reactive airway disease)     bronchitis needs an albuterol inhaler ~2x/yr.    Severe episode of recurrent major depressive disorder, without psychotic features (HCC) 10/25/2011    Uncomfortable fullness after meals     Wears glasses       Past Surgical History:   Procedure Laterality Date    BREAST BIOPSY Left 11/23/2022    pseudo-angiomatous stromal hyperplasia (PASH)    CYSTOSCOPY,MANIPULATN  08/10/2018    Dr. Ross CABRERA GID & LOCLZJ NDL/CATH SPI DX/THER NJX  04/25/2014    Procedure: TRANSFORAMINAL EPIDURAL - LUMBAR;  Surgeon: Kaden Whatley MD;  Location: [facility]    HAND/FINGER SURGERY UNLISTED Right     INJECTION, W/WO CONTRAST, DX/THERAPEUTIC SUBSTANCE, EPIDURAL/SUBARACHNOID; LUMBAR/SACRAL  04/25/2014    Procedure: TRANSFORAMINAL EPIDURAL - LUMBAR;  Surgeon: Kaden Whatley MD;  Location: [facility]    M-SEDAJ BY  PHYS PERFRMG SVC 5+ YR  04/25/2014    Procedure: TRANSFORAMINAL EPIDURAL - LUMBAR;  Surgeon: Kaden Whatley MD;  Location: [facility]    MIGUEL BIOPSY STEREO NODULE 1 SITE LEFT (CPT=19081)      OTHER SURGICAL HISTORY      Right shoulder.    SHOULDER ARTHROSCOPY Right 05/15/2023    SLAP repair    US BREAST BIOPSY Left 08/31/2022    WISDOM TEETH REMOVED        Family History   Problem Relation Age of Onset    Hypertension Father     PTSD Father     Skin cancer Father 60        Melanoma    Other (Other) Father     Cancer Father 60    Diabetes Mother     Hypertension Mother      Asthma Mother         COPD    Cancer Brother         testicular cancer    Bipolar Disorder Brother     Other (Other) Brother     Depression Brother       Social History     Socioeconomic History    Marital status:    Tobacco Use    Smoking status: Never    Smokeless tobacco: Never   Vaping Use    Vaping Use: Never used   Substance and Sexual Activity    Alcohol use: Yes     Alcohol/week: 1.0 standard drink of alcohol     Types: 1 Standard drinks or equivalent per week     Comment: socially    Drug use: Never    Sexual activity: Not Currently     Partners: Female   Other Topics Concern    Caffeine Concern Yes     Comment: coffee     Exercise Yes     Comment: 5-6 x week, weights, running    Seat Belt Yes   Social History Narrative    EtOH--1 drink 1x/week or 1 drink every 2 weeks        No Known Allergies   Current Outpatient Medications   Medication Sig Dispense Refill    amoxicillin clavulanate 875-125 MG Oral Tab Take 1 tablet by mouth 2 (two) times daily for 10 days. Take with food to minimize upset stomach. 20 tablet 0    predniSONE 20 MG Oral Tab Take 1 tablet (20 mg total) by mouth 2 (two) times daily for 5 days. 10 tablet 0    ALPRAZolam ER 2 MG Oral Tablet 24 Hr TAKE 1 TABLET BY MOUTH EVERY MORNING 30 tablet 0    traZODone 50 MG Oral Tab Take 0.5 tablets (25 mg total) by mouth nightly. After 4 nights can increase to 1 tab (50 mg total) if needed for sleep 30 tablet 0    FLUoxetine HCl 40 MG Oral Cap Take 1 capsule (40 mg total) by mouth daily. 90 capsule 1    methylphenidate ER (CONCERTA) 54 MG Oral Tab CR Take 1 tablet (54 mg total) by mouth every morning. 30 tablet 0    ALPRAZolam 1 MG Oral Tab TAKE 1 TABLET BY MOUTH IN THE AFTERNOON AS NEEDED FOR ANXIETY 30 tablet 0    gabapentin 100 MG Oral Cap Take 1 cap (100 mg total) with 800 mg for a total dose of 900 mg in am and afternoon, and take 4 caps (400 mg total) with 800 mg for a total dose of 1200 mg at bedtime 180 capsule 2    gabapentin 800 MG  Oral Tab Take 1 tablet (800 mg total) by mouth 3 (three) times daily. Take with 100 mg for a total dose of 900 mg twice a day, and take with 400 mg (total dose 1200 mg)  at bedtime. 90 tablet 2    montelukast 10 MG Oral Tab Take 1 tablet (10 mg total) by mouth nightly. 90 tablet 3    fluticasone-salmeterol (ADVAIR HFA) 230-21 MCG/ACT Inhalation Aerosol Inhale 2 puffs into the lungs 2 (two) times daily. 12 g 3    ARIPiprazole 10 MG Oral Tab Take 1 tablet (10 mg total) by mouth daily. 30 tablet 2    busPIRone HCl 30 MG Oral Tab Take 1 tablet (30 mg total) by mouth 3 (three) times daily. 270 tablet 0    lamoTRIgine 200 MG Oral Tab TAKE 1 TABLET BY MOUTH IN THE MORNING AND TAKE 1 TABLET BY MOUTH AT DINNER 180 tablet 1    methylPREDNISolone (MEDROL) 4 MG Oral Tablet Therapy Pack As directed. 21 each 0    ondansetron 4 MG Oral Tablet Dispersible Take 1 tablet (4 mg total) by mouth every 8 (eight) hours as needed for Nausea. 10 tablet 0    benzonatate 200 MG Oral Cap Take 1 capsule (200 mg total) by mouth 3 (three) times daily as needed for cough. 21 capsule 0    albuterol 108 (90 Base) MCG/ACT Inhalation Aero Soln Inhale 2 puffs into the lungs every 4 (four) hours as needed for Wheezing or Shortness of Breath (cough). 1 each 0    albuterol (2.5 MG/3ML) 0.083% Inhalation Nebu Soln Take 3 mL (2.5 mg total) by nebulization every 4 (four) hours as needed for Wheezing or Shortness of Breath. 75 mL 3    SUMAtriptan (IMITREX) 25 MG Oral Tab Take 1 tablet (25 mg total) by mouth every 2 (two) hours as needed for Migraine. Use at onset; repeat once after 2 HRS-ONLY 2 IN 24 HR MAX 9 tablet 1       OBJECTIVE  Physical Exam:   Speaking in full sentences comfortably and Normal work of breathing    ASSESSMENT & PLAN  Diagnoses and all orders for this visit:    Breast cancer screening by mammogram  -     [facility] ANTHONY 2D+3D SCREENING BILAT (CPT=77067/81754); Future    Acute non-recurrent frontal sinusitis  -     amoxicillin clavulanate  875-125 MG Oral Tab; Take 1 tablet by mouth 2 (two) times daily for 10 days. Take with food to minimize upset stomach.  -     predniSONE 20 MG Oral Tab; Take 1 tablet (20 mg total) by mouth 2 (two) times daily for 5 days.    Pharyngitis, unspecified etiology    Recommended increased fluids/humidity  12-hour decongestant nasal spray twice daily for a maximum of 4 days  Steam inhalation for sinus pressure   OTC cold and flu medication as needed.   Robitussin or Robitussin DM as needed for cough  1 tsp honey for the cough prn    Tylenol as needed/Ibuprofen as needed, do not exceed 4000 mg of acetaminophen or 2400 mg of ibuprofen    Follow Up:  Recheck if not improved in one week or sooner if worsening.    Plan discussed with patient. All questions answered. Patient is agreeable to this plan of care.     Time spent on telephone call and encounter 10 min.       ASHLEY Burgos

## 2024-02-07 ENCOUNTER — TELEPHONE (OUTPATIENT)
Dept: FAMILY MEDICINE CLINIC | Facility: CLINIC | Age: 44
End: 2024-02-07

## 2024-02-07 DIAGNOSIS — R53.83 OTHER FATIGUE: ICD-10-CM

## 2024-02-07 DIAGNOSIS — J02.9 SORE THROAT: Primary | ICD-10-CM

## 2024-02-07 NOTE — TELEPHONE ENCOUNTER
Patient had Telemedicine visit 2/5/24  Given Augmentin and Prednisone for acute sinusitis and pharyngitis.  Pt is requesting order for mono test, states she is not feeling better.    Ok to order?

## 2024-02-07 NOTE — TELEPHONE ENCOUNTER
Pt did telehealth with Eyad. Pt not feeling any better. Would like a blood test for mono.Please call pt back once ordered.

## 2024-02-26 ENCOUNTER — OFFICE VISIT (OUTPATIENT)
Dept: FAMILY MEDICINE CLINIC | Facility: CLINIC | Age: 44
End: 2024-02-26
Payer: COMMERCIAL

## 2024-02-26 VITALS
HEART RATE: 73 BPM | DIASTOLIC BLOOD PRESSURE: 70 MMHG | SYSTOLIC BLOOD PRESSURE: 120 MMHG | TEMPERATURE: 99 F | WEIGHT: 151 LBS | BODY MASS INDEX: 25.78 KG/M2 | HEIGHT: 64 IN | OXYGEN SATURATION: 100 % | RESPIRATION RATE: 16 BRPM

## 2024-02-26 DIAGNOSIS — H10.33 ACUTE BACTERIAL CONJUNCTIVITIS OF BOTH EYES: ICD-10-CM

## 2024-02-26 DIAGNOSIS — R50.9 ELEVATED TEMPERATURE: ICD-10-CM

## 2024-02-26 DIAGNOSIS — J02.9 SORE THROAT: Primary | ICD-10-CM

## 2024-02-26 LAB
CONTROL LINE PRESENT WITH A CLEAR BACKGROUND (YES/NO): YES YES/NO
KIT LOT #: 7592 NUMERIC
STREP GRP A CUL-SCR: NEGATIVE

## 2024-02-26 PROCEDURE — 87081 CULTURE SCREEN ONLY: CPT | Performed by: FAMILY MEDICINE

## 2024-02-26 RX ORDER — DEXAMETHASONE SODIUM PHOSPHATE 1 MG/ML
1 SOLUTION/ DROPS OPHTHALMIC
Qty: 5 ML | Refills: 0 | Status: SHIPPED | OUTPATIENT
Start: 2024-02-26

## 2024-02-26 RX ORDER — DEXAMETHASONE SODIUM PHOSPHATE 1 MG/ML
1 SOLUTION/ DROPS OPHTHALMIC
Qty: 5 ML | Refills: 0 | Status: SHIPPED | OUTPATIENT
Start: 2024-02-26 | End: 2024-02-26

## 2024-02-26 RX ORDER — AMOXICILLIN AND CLAVULANATE POTASSIUM 875; 125 MG/1; MG/1
1 TABLET, FILM COATED ORAL 2 TIMES DAILY
Qty: 20 TABLET | Refills: 0 | Status: SHIPPED | OUTPATIENT
Start: 2024-02-26 | End: 2024-03-07

## 2024-02-26 RX ORDER — POLYMYXIN B SULFATE AND TRIMETHOPRIM 1; 10000 MG/ML; [USP'U]/ML
1 SOLUTION OPHTHALMIC EVERY 4 HOURS
COMMUNITY
Start: 2024-02-25

## 2024-02-26 RX ORDER — AMOXICILLIN AND CLAVULANATE POTASSIUM 875; 125 MG/1; MG/1
1 TABLET, FILM COATED ORAL 2 TIMES DAILY
Qty: 20 TABLET | Refills: 0 | Status: SHIPPED | OUTPATIENT
Start: 2024-02-26 | End: 2024-02-26

## 2024-02-26 NOTE — PROGRESS NOTES
[facility] Visit Note  2/26/2024      Subjective:      Patient ID: Juliana Ness is a 43 year old female.    Chief Complaint:  Chief Complaint   Patient presents with    [place] [facility]     Was treated at [facility] yesterday, using the Polymyxin eye drops as prescribed.    Sore Throat     bilateral earache, slight cough that is productive at times & fatigue. She was treated at [facility] Saturday. Today sore throat is much worse.          HPI:  Juliana Ness is a 43 year old female who is being seen today for the above.       [facility] Saturday for URI  And Sunday for pinkeye- taking antibiotic drops. Is of both eyes and is painful/like sandpaper. Lots of mucus coming out and has to pry eye open.    Taking:  Flonase  Mucinex D    Sore throat.  Temp is 99.1 today. Last tylenol 2# ~8AM  B ear pain, fatigue, intermittently productive cough    Wife is sick as well.      Denies- fever, chills, shortness of breath, chest pain, loss of taste/smell, headaches, myalgia, nausea, vomiting, diarrhea      Patient has been sick with 4 different illnesses since December including COVID, others.  She is about to run out of her sick time and have to different to her personal time.  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    Review of Systems - as stated above in the HPI      Objective:     Vitals:    02/26/24 1302   BP: 120/70   Pulse: 73   Resp: 16   Temp: 99.1 °F (37.3 °C)   TempSrc: Temporal   SpO2: 100%   Weight: 151 lb (68.5 kg)   Height: 5' 4\" (1.626 m)       Physical Examination   General:  Alert, in no acute distress  HEENT: NCAT, EOMI, normal TMs, oropharynx, and nasal turbinates. + swollen eyelids with pink bulbar conjunctiva.   Neck:  No cervical lymphadenopathy  CV: Regular rate and rhythm. No murmurs, gallops, or  rubs.  Lungs:  Clear to auscultation B, no wheezes, rales, or rhonchi, normal respiratory effort  Abd:  +bowel sounds, soft  Ext:  No pedal edema,  Pedal pulses 2+ B      Rapid strep- neg today      Assessment:     1. Sore throat  - Rapid Strep  - Grp A Strep Cult, Throat [E]; Future  - Benadryl/Maalox/Lidocaine 1:1:1 solution; Take 5-10 mL by mouth TID AC&HS. Swish and swallow and spit  Dispense: 90 mL; Refill: 0  - amoxicillin clavulanate 875-125 MG Oral Tab; Take 1 tablet by mouth 2 (two) times daily for 10 days.  Dispense: 20 tablet; Refill: 0  - Grp A Strep Cult, Throat [E]    2. Elevated temperature  - Grp A Strep Cult, Throat [E]; Future  - Grp A Strep Cult, Throat [E]    3. Acute bacterial conjunctivitis of both eyes  - dexamethasone 0.1 % Ophthalmic Solution; Place 1 drop into both eyes every 3 (three) hours. X7-10 days  Dispense: 5 mL; Refill: 0  -cool rag  -to stay off work until no more drainage and no fever x 24 hr.  -wash hands frequently      Return for if symptoms worsen/don't improve.

## 2024-02-27 ENCOUNTER — LAB ENCOUNTER (OUTPATIENT)
Dept: LAB | Age: 44
End: 2024-02-27
Attending: FAMILY MEDICINE
Payer: COMMERCIAL

## 2024-02-27 ENCOUNTER — TELEPHONE (OUTPATIENT)
Facility: CLINIC | Age: 44
End: 2024-02-27

## 2024-02-27 ENCOUNTER — PATIENT MESSAGE (OUTPATIENT)
Dept: FAMILY MEDICINE CLINIC | Facility: CLINIC | Age: 44
End: 2024-02-27

## 2024-02-27 DIAGNOSIS — J42 CHRONIC BRONCHITIS, UNSPECIFIED CHRONIC BRONCHITIS TYPE (HCC): ICD-10-CM

## 2024-02-27 DIAGNOSIS — R53.83 OTHER FATIGUE: ICD-10-CM

## 2024-02-27 DIAGNOSIS — E53.8 VITAMIN B12 DEFICIENCY: ICD-10-CM

## 2024-02-27 DIAGNOSIS — R05.3 CHRONIC COUGH: ICD-10-CM

## 2024-02-27 DIAGNOSIS — R06.09 DYSPNEA ON EXERTION: ICD-10-CM

## 2024-02-27 DIAGNOSIS — E55.9 VITAMIN D DEFICIENCY: ICD-10-CM

## 2024-02-27 DIAGNOSIS — J02.9 SORE THROAT: ICD-10-CM

## 2024-02-27 LAB
BASOPHILS # BLD AUTO: 0.02 X10(3) UL (ref 0–0.2)
BASOPHILS NFR BLD AUTO: 0.4 %
EOSINOPHIL # BLD AUTO: 0.15 X10(3) UL (ref 0–0.7)
EOSINOPHIL NFR BLD AUTO: 2.6 %
ERYTHROCYTE [DISTWIDTH] IN BLOOD BY AUTOMATED COUNT: 13.6 %
FOLATE SERPL-MCNC: 17.5 NG/ML (ref 8.7–?)
HCT VFR BLD AUTO: 39.8 %
HETEROPH AB SER QL: NEGATIVE
HGB BLD-MCNC: 12.5 G/DL
IMM GRANULOCYTES # BLD AUTO: 0.02 X10(3) UL (ref 0–1)
IMM GRANULOCYTES NFR BLD: 0.4 %
LYMPHOCYTES # BLD AUTO: 1.53 X10(3) UL (ref 1–4)
LYMPHOCYTES NFR BLD AUTO: 26.8 %
MCH RBC QN AUTO: 29.3 PG (ref 26–34)
MCHC RBC AUTO-ENTMCNC: 31.4 G/DL (ref 31–37)
MCV RBC AUTO: 93.4 FL
MONOCYTES # BLD AUTO: 0.46 X10(3) UL (ref 0.1–1)
MONOCYTES NFR BLD AUTO: 8.1 %
NEUTROPHILS # BLD AUTO: 3.52 X10 (3) UL (ref 1.5–7.7)
NEUTROPHILS # BLD AUTO: 3.52 X10(3) UL (ref 1.5–7.7)
NEUTROPHILS NFR BLD AUTO: 61.7 %
PLATELET # BLD AUTO: 249 10(3)UL (ref 150–450)
RBC # BLD AUTO: 4.26 X10(6)UL
VIT B12 SERPL-MCNC: 736 PG/ML (ref 193–986)
VIT D+METAB SERPL-MCNC: 39.9 NG/ML (ref 30–100)
WBC # BLD AUTO: 5.7 X10(3) UL (ref 4–11)

## 2024-02-27 PROCEDURE — 86664 EPSTEIN-BARR NUCLEAR ANTIGEN: CPT

## 2024-02-27 PROCEDURE — 82785 ASSAY OF IGE: CPT

## 2024-02-27 PROCEDURE — 85025 COMPLETE CBC W/AUTO DIFF WBC: CPT

## 2024-02-27 PROCEDURE — 82746 ASSAY OF FOLIC ACID SERUM: CPT

## 2024-02-27 PROCEDURE — 86665 EPSTEIN-BARR CAPSID VCA: CPT

## 2024-02-27 PROCEDURE — 86003 ALLG SPEC IGE CRUDE XTRC EA: CPT

## 2024-02-27 PROCEDURE — 82607 VITAMIN B-12: CPT

## 2024-02-27 PROCEDURE — 82306 VITAMIN D 25 HYDROXY: CPT

## 2024-02-27 PROCEDURE — 86403 PARTICLE AGGLUT ANTBDY SCRN: CPT

## 2024-02-27 PROCEDURE — 36415 COLL VENOUS BLD VENIPUNCTURE: CPT

## 2024-02-27 NOTE — TELEPHONE ENCOUNTER
Pt requesting lab results.  Advised pt that all the labs are not back yet.  Pt has been ill the past several months and has not been able to follow up.  Informed pt that Dr. Borjas advised her to follow up in mid January.  Transferred to  to schedule appt to review labs.

## 2024-02-28 ENCOUNTER — PATIENT MESSAGE (OUTPATIENT)
Dept: FAMILY MEDICINE CLINIC | Facility: CLINIC | Age: 44
End: 2024-02-28

## 2024-02-28 LAB
EBV NA IGG SER QL IA: POSITIVE
EBV VCA IGG SER QL IA: POSITIVE
EBV VCA IGM SER QL IA: NEGATIVE

## 2024-02-28 NOTE — TELEPHONE ENCOUNTER
From: Juliana Ness  To: Eyad Lira  Sent: 2/28/2024 11:36 AM CST  Subject: Positive bloodwork results    Wolf Castano, I had blood work taken yesterday and two of my results came back positive   I would like to know what they mean I’m concerned and want to make sure all is ok  Can you call me when you have a minute please I’m at work but I will take the call  It will put my mind at ease  Thanks so much   You are the best  Juliana Ness

## 2024-02-28 NOTE — TELEPHONE ENCOUNTER
From: Juliana Ness  To: Deanna Padron  Sent: 2/27/2024 4:38 PM CST  Subject: Lab results     Hi Dr Padron, I finally got my bloodwork today but it was ordered from another doctor. Is there anyway that you can get the results so I know if everything turned out ok  The doctor that ordered it said I can’t get the results until April 1st  It’s my pulmonologist   Thanks  Juliana Ness

## 2024-02-29 LAB

## 2024-03-07 ENCOUNTER — TELEMEDICINE (OUTPATIENT)
Dept: FAMILY MEDICINE CLINIC | Facility: CLINIC | Age: 44
End: 2024-03-07
Payer: COMMERCIAL

## 2024-03-07 DIAGNOSIS — J02.9 SORE THROAT: Primary | ICD-10-CM

## 2024-03-07 PROCEDURE — 99213 OFFICE O/P EST LOW 20 MIN: CPT | Performed by: FAMILY MEDICINE

## 2024-03-07 RX ORDER — DOXYCYCLINE HYCLATE 100 MG
100 TABLET ORAL 2 TIMES DAILY
Qty: 20 TABLET | Refills: 0 | Status: SHIPPED | OUTPATIENT
Start: 2024-03-07 | End: 2024-03-17

## 2024-03-07 NOTE — PROGRESS NOTES
Subjective:   Patient ID: Juliana Ness is a 43 year old female.    HPI  Ms. Ness is a very pleasant 43-year-old female with history of depression, anxiety presents for video visit today for persistent sore throat.  Previously she has been prescribed with Augmentin but had developed swelling and itching in her hands.  She had called the pharmacist and was advised to stop this.  She did have relief when she was taking this antibiotic in terms of her sore throat and congestion.  However upon stoppage of this medication she has had symptoms again.  She feels tired and has had ear pain also.  She had laboratory test done which showed Ig G antibodies to EBV.  She tested negative for COVID-19 and influenza previously.  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 I had reviewed past medical and family histories together with allergy and medication lists documented.    History/Other:   Review of Systems   Constitutional:  Negative for fever.   HENT:  Negative for trouble swallowing.    Respiratory:  Negative for cough and shortness of breath.    Cardiovascular:  Negative for chest pain.   Gastrointestinal:  Negative for abdominal pain, diarrhea, nausea and vomiting.   Neurological:  Negative for dizziness.     Current Outpatient Medications   Medication Sig Dispense Refill    Doxycycline Hyclate 100 MG Oral Tab Take 1 tablet (100 mg total) by mouth 2 (two) times daily for 10 days. 20 tablet 0    polymyxin B-trimethoprim 83281-6.1 UNIT/ML-% Ophthalmic Solution Place 1 drop into both eyes every 4 (four) hours.      dexamethasone 0.1 % Ophthalmic Solution Place 1 drop into both eyes every 3 (three) hours. X7-10 days 5 mL 0    Benadryl/Maalox/Lidocaine 1:1:1 solution Take 5-10 mL by mouth TID AC&HS. Swish and swallow and spit 90 mL 0    amoxicillin clavulanate 875-125 MG Oral Tab Take 1 tablet by mouth 2 (two) times daily for 10 days.  20 tablet 0    methylphenidate ER (CONCERTA) 54 MG Oral Tab CR Take 1 tablet (54 mg total) by mouth every morning. 30 tablet 0    ALPRAZolam ER 2 MG Oral Tablet 24 Hr TAKE 1 TABLET BY MOUTH EVERY MORNING 30 tablet 0    traZODone 50 MG Oral Tab Take 0.5 tablets (25 mg total) by mouth nightly. After 4 nights can increase to 1 tab (50 mg total) if needed for sleep 30 tablet 0    FLUoxetine HCl 40 MG Oral Cap Take 1 capsule (40 mg total) by mouth daily. 90 capsule 1    ALPRAZolam 1 MG Oral Tab TAKE 1 TABLET BY MOUTH IN THE AFTERNOON AS NEEDED FOR ANXIETY 30 tablet 0    gabapentin 100 MG Oral Cap Take 1 cap (100 mg total) with 800 mg for a total dose of 900 mg in am and afternoon, and take 4 caps (400 mg total) with 800 mg for a total dose of 1200 mg at bedtime 180 capsule 2    gabapentin 800 MG Oral Tab Take 1 tablet (800 mg total) by mouth 3 (three) times daily. Take with 100 mg for a total dose of 900 mg twice a day, and take with 400 mg (total dose 1200 mg)  at bedtime. 90 tablet 2    montelukast 10 MG Oral Tab Take 1 tablet (10 mg total) by mouth nightly. 90 tablet 3    fluticasone-salmeterol (ADVAIR HFA) 230-21 MCG/ACT Inhalation Aerosol Inhale 2 puffs into the lungs 2 (two) times daily. 12 g 3    ARIPiprazole 10 MG Oral Tab Take 1 tablet (10 mg total) by mouth daily. 30 tablet 2    busPIRone HCl 30 MG Oral Tab Take 1 tablet (30 mg total) by mouth 3 (three) times daily. 270 tablet 0    lamoTRIgine 200 MG Oral Tab TAKE 1 TABLET BY MOUTH IN THE MORNING AND TAKE 1 TABLET BY MOUTH AT DINNER 180 tablet 1    ondansetron 4 MG Oral Tablet Dispersible Take 1 tablet (4 mg total) by mouth every 8 (eight) hours as needed for Nausea. 10 tablet 0    albuterol 108 (90 Base) MCG/ACT Inhalation Aero Soln Inhale 2 puffs into the lungs every 4 (four) hours as needed for Wheezing or Shortness of Breath (cough). 1 each 0    albuterol (2.5 MG/3ML) 0.083% Inhalation Nebu Soln Take 3 mL (2.5 mg total) by nebulization every 4 (four) hours  as needed for Wheezing or Shortness of Breath. 75 mL 3    SUMAtriptan (IMITREX) 25 MG Oral Tab Take 1 tablet (25 mg total) by mouth every 2 (two) hours as needed for Migraine. Use at onset; repeat once after 2 HRS-ONLY 2 IN 24 HR MAX 9 tablet 1     Allergies:No Known Allergies    Objective:   Physical Exam  Constitutional:       General: She is not in acute distress.  Neurological:      Mental Status: She is alert.         Assessment & Plan:   1. Sore throat    -Likely due to underlying respiratory infection  - Will start on doxycycline 100 mg 1 tablet twice a day for the next 10 days  - Keep hydrated  - May take Tylenol or ibuprofen as needed for fever or pain  - She did ask if she could exercise and workout and then told her that she may but should know her limits in terms of resting it when she feels tired or when she feels short of breath.  -Call or come in sooner if symptoms worsen or persist      This note was prepared using Dragon [employer] voice recognition dictation software. As a result errors may occur. When identified these errors have been corrected. While every attempt is made to correct errors during dictation discrepancies may still exist          No orders of the defined types were placed in this encounter.      Meds This Visit:  Requested Prescriptions     Signed Prescriptions Disp Refills    Doxycycline Hyclate 100 MG Oral Tab 20 tablet 0     Sig: Take 1 tablet (100 mg total) by mouth 2 (two) times daily for 10 days.       Imaging & Referrals:  None

## 2024-04-01 ENCOUNTER — TELEMEDICINE (OUTPATIENT)
Facility: CLINIC | Age: 44
End: 2024-04-01
Payer: COMMERCIAL

## 2024-04-01 DIAGNOSIS — R05.1 ACUTE COUGH: ICD-10-CM

## 2024-04-01 DIAGNOSIS — J45.40 MODERATE PERSISTENT ASTHMA WITHOUT COMPLICATION (HCC): Primary | ICD-10-CM

## 2024-04-01 DIAGNOSIS — J40 BRONCHITIS WITH ACUTE WHEEZING: ICD-10-CM

## 2024-04-01 DIAGNOSIS — J30.9 ALLERGIC RHINITIS, UNSPECIFIED SEASONALITY, UNSPECIFIED TRIGGER: ICD-10-CM

## 2024-04-01 RX ORDER — FLUTICASONE PROPIONATE AND SALMETEROL XINAFOATE 230; 21 UG/1; UG/1
2 AEROSOL, METERED RESPIRATORY (INHALATION) 2 TIMES DAILY
Qty: 12 G | Refills: 11 | Status: SHIPPED | OUTPATIENT
Start: 2024-04-01

## 2024-04-01 RX ORDER — ALBUTEROL SULFATE 90 UG/1
2 AEROSOL, METERED RESPIRATORY (INHALATION) EVERY 4 HOURS PRN
Qty: 1 EACH | Refills: 11 | Status: SHIPPED | OUTPATIENT
Start: 2024-04-01

## 2024-04-01 RX ORDER — MONTELUKAST SODIUM 10 MG/1
10 TABLET ORAL NIGHTLY
Qty: 90 TABLET | Refills: 3 | Status: SHIPPED | OUTPATIENT
Start: 2024-04-01 | End: 2025-03-27

## 2024-04-01 NOTE — PROGRESS NOTES
[facility] Pulmonary Follow Up Note    This visit is conducted using Telemedicine with live, interactive video and audio.    Patient has been referred to the [facility] website at [email or web address] to review the yearly Consent to Treat document.      Chief Complaint:  Chief Complaint   Patient presents with    Dyspnea       History of Present Illness:  Juliana Ness is a 43 year old female who presents today for follow up of asthma.    Interval history:  Since last visit, patient reports good control breathing with Advair and Singulair.  Allergy testing completed that showed mild allergy to dogs, she has 1 hypoallergenic dog.  Overall reports no significant issues, her breathing is in good shape.      Past Medical History:   Past Medical History:   Diagnosis Date    Abdominal pain     ADD (attention deficit disorder) 07/24/2013    ADHD 2011    Anxiety     Atypical lobular hyperplasia (ALH) of left breast 08/31/2022    Back pain     Belching     Bipolar affective (HCC)     Bipolar disorder (HCC) 07/02/2013    Bloating     Constipation     Depression     Diarrhea, unspecified     Dysmenorrhea     Fatigue     Feeling lonely     Fibroids     Frequent use of laxatives     Generalized anxiety disorder 06/21/2011    History of depression     History of suicide attempt     x 3 (overdose on meds, 2020- CO poisoning)    IFG (impaired fasting glucose) 03/15/2022    Irregular bowel habits     Left ovarian cyst     Leiomyoma of uterus, unspecified 03/17/2014    Loss of appetite     Nausea     Non-seasonal allergic rhinitis due to pollen 02/15/2018    OAB (overactive bladder)     OCD (obsessive compulsive disorder)     OCD--started on Effexor and referral to therapist--sees Una Barker--MELANI [phone]---taking prazosin for bad dreams    OCD (obsessive compulsive disorder)     OCD--started on medication and referral to therapist    Panic attacks     thought to have asthma--but it was panic attacks and SOB--PFT's see media-is WNL,  off Asthma meds since early 2011    Problems with swallowing     PTSD (post-traumatic stress disorder)     RAD (reactive airway disease) ([facility])     bronchitis needs an albuterol inhaler ~2x/yr.    Severe episode of recurrent major depressive disorder, without psychotic features ([facility]) 10/25/2011    Uncomfortable fullness after meals     Wears glasses         Past Surgical History:   Past Surgical History:   Procedure Laterality Date    BREAST BIOPSY Left 11/23/2022    pseudo-angiomatous stromal hyperplasia (PASH)    CYSTOSCOPY,MANIPULATN  08/10/2018    Dr. Ross CABRERA GID & LOCLZJ NDL/CATH SPI DX/THER NJX  04/25/2014    Procedure: TRANSFORAMINAL EPIDURAL - LUMBAR;  Surgeon: Kaden Whatley MD;  Location: [facility]    HAND/FINGER SURGERY UNLISTED Right     INJECTION, W/WO CONTRAST, DX/THERAPEUTIC SUBSTANCE, EPIDURAL/SUBARACHNOID; LUMBAR/SACRAL  04/25/2014    Procedure: TRANSFORAMINAL EPIDURAL - LUMBAR;  Surgeon: Kaden Whatley MD;  Location: [facility]    M-SEDAJ BY  PHYS PERFRMG SVC 5+ YR  04/25/2014    Procedure: TRANSFORAMINAL EPIDURAL - LUMBAR;  Surgeon: Kaden Whatley MD;  Location: [facility]    MIGUEL BIOPSY STEREO NODULE 1 SITE LEFT (CPT=19081)      OTHER SURGICAL HISTORY      Right shoulder.    SHOULDER ARTHROSCOPY Right 05/15/2023    SLAP repair    US BREAST BIOPSY Left 08/31/2022    WISDOM TEETH REMOVED         Family Medical History:   Family History   Problem Relation Age of Onset    Hypertension Father     PTSD Father     Skin cancer Father 60        Melanoma    Other (Other) Father     Cancer Father 60    Diabetes Mother     Hypertension Mother     Asthma Mother         COPD    Cancer Brother         testicular cancer    Bipolar Disorder Brother     Other (Other) Brother     Depression Brother         Social History:   Social History     Socioeconomic History    Marital status:      Spouse name: Not on file    Number of children:  Not on file    Years of education: Not on file    Highest education level: Not on file   Occupational History    Not on file   Tobacco Use    Smoking status: Never    Smokeless tobacco: Never   Vaping Use    Vaping Use: Never used   Substance and Sexual Activity    Alcohol use: Yes     Alcohol/week: 1.0 standard drink of alcohol     Types: 1 Standard drinks or equivalent per week     Comment: socially    Drug use: Never    Sexual activity: Not Currently     Partners: Female   Other Topics Concern     Service Not Asked    Blood Transfusions Not Asked    Caffeine Concern Yes     Comment: coffee     Occupational Exposure Not Asked    Hobby Hazards Not Asked    Sleep Concern Not Asked    Stress Concern Not Asked    Weight Concern Not Asked    Special Diet Not Asked    Back Care Not Asked    Exercise Yes     Comment: 5-6 x week, weights, running    Bike Helmet Not Asked    Seat Belt Yes    Self-Exams Not Asked   Social History Narrative    EtOH--1 drink 1x/week or 1 drink every 2 weeks     Social Determinants of Health     Financial Resource Strain: Not on file   Food Insecurity: Not on file   Transportation Needs: Not on file   Physical Activity: Not on file   Stress: Not on file   Social Connections: Not on file   Housing Stability: Not on file        Medications:   Current Outpatient Medications   Medication Sig Dispense Refill    BUSPIRONE HCL 30 MG Oral Tab TAKE 1 TABLET BY MOUTH THREE TIMES DAILY 270 tablet 0    GABAPENTIN 800 MG Oral Tab TAKE ONE TABLET BY MOUTH THREE TIMES DAILY WITH A 100MG TABSULE FOR A TOTAL 900MG THREE TIMES DAILY 90 tablet 2    ALPRAZolam 1 MG Oral Tab TAKE 1 TABLET BY MOUTH IN THE AFTERNOON AS NEEDED FOR ANXIETY 30 tablet 0    ALPRAZolam ER 2 MG Oral Tablet 24 Hr TAKE 1 TABLET BY MOUTH EVERY MORNING 30 tablet 0    methylphenidate ER (CONCERTA) 54 MG Oral Tab CR Take 1 tablet (54 mg total) by mouth every morning. 30 tablet 0    polymyxin B-trimethoprim 31374-8.1 UNIT/ML-% Ophthalmic  Solution Place 1 drop into both eyes every 4 (four) hours.      dexamethasone 0.1 % Ophthalmic Solution Place 1 drop into both eyes every 3 (three) hours. X7-10 days 5 mL 0    Benadryl/Maalox/Lidocaine 1:1:1 solution Take 5-10 mL by mouth TID AC&HS. Swish and swallow and spit 90 mL 0    traZODone 50 MG Oral Tab Take 0.5 tablets (25 mg total) by mouth nightly. After 4 nights can increase to 1 tab (50 mg total) if needed for sleep 30 tablet 0    FLUoxetine HCl 40 MG Oral Cap Take 1 capsule (40 mg total) by mouth daily. 90 capsule 1    gabapentin 100 MG Oral Cap Take 1 cap (100 mg total) with 800 mg for a total dose of 900 mg in am and afternoon, and take 4 caps (400 mg total) with 800 mg for a total dose of 1200 mg at bedtime 180 capsule 2    montelukast 10 MG Oral Tab Take 1 tablet (10 mg total) by mouth nightly. 90 tablet 3    fluticasone-salmeterol (ADVAIR HFA) 230-21 MCG/ACT Inhalation Aerosol Inhale 2 puffs into the lungs 2 (two) times daily. 12 g 3    ARIPiprazole 10 MG Oral Tab Take 1 tablet (10 mg total) by mouth daily. 30 tablet 2    lamoTRIgine 200 MG Oral Tab TAKE 1 TABLET BY MOUTH IN THE MORNING AND TAKE 1 TABLET BY MOUTH AT DINNER 180 tablet 1    ondansetron 4 MG Oral Tablet Dispersible Take 1 tablet (4 mg total) by mouth every 8 (eight) hours as needed for Nausea. 10 tablet 0    albuterol 108 (90 Base) MCG/ACT Inhalation Aero Soln Inhale 2 puffs into the lungs every 4 (four) hours as needed for Wheezing or Shortness of Breath (cough). 1 each 0    albuterol (2.5 MG/3ML) 0.083% Inhalation Nebu Soln Take 3 mL (2.5 mg total) by nebulization every 4 (four) hours as needed for Wheezing or Shortness of Breath. 75 mL 3    SUMAtriptan (IMITREX) 25 MG Oral Tab Take 1 tablet (25 mg total) by mouth every 2 (two) hours as needed for Migraine. Use at onset; repeat once after 2 HRS-ONLY 2 IN 24 HR MAX 9 tablet 1       Review of Systems: Review of Systems     Physical Exam:  LMP 02/24/2024 (Approximate)      Physical  exam deferred due to video visit    Results:  Personally reviewed  XR CHEST PA + LAT CHEST (NCX=48238)  Narrative: PROCEDURE:  XR CHEST PA + LAT CHEST (CPT=71046)     INDICATIONS:  J98.01 Acute bronchospasm     COMPARISON:  [facility], XR, XR CHEST PA + LAT CHEST (CPT=71046), 11/17/2022, 8:35 AM.     TECHNIQUE:  PA and lateral chest radiographs were obtained.     PATIENT STATED HISTORY: (As transcribed by Technologist)  Patient complains of a chronic cough for over one month.  The cough alternates between productive and dry but is mostly productive.  Patient also complains of shortness of breath and fatigue.           FINDINGS:    LUNGS:  No focal consolidation.  Normal vascularity.  CARDIAC:  Normal size cardiac silhouette.  MEDIASTINUM:  Normal.  PLEURA:  Normal.  No pleural effusions.  BONES:  Normal for age.                   Impression: CONCLUSION:  No acute process.        LOCATION:  [facility]        Dictated by (CST): Al Maloney MD on 11/01/2023 at 8:45 AM       Finalized by (CST): Al Maloney MD on 11/01/2023 at 8:45 AM         PFTs:      4/20/2011     5:00 PM   PRE PFT RESULTS   FVC (L) 4.31 L   FVC % Predicted 116.2 %   FEV1 (L) 3.35 L   FEV1 % Predicted 105.5 %   FEV1/FVC (%) 0.78 %          No data to display                    WBC: 5.7, done on 2/27/2024.  HGB: 12.5, done on 2/27/2024.  PLT: 249, done on 2/27/2024.     Glucose: 82, done on 7/13/2023.  Cr: 0.86, done on 7/13/2023.  GFR(AA): 81, done on 1/29/2022.  GFR (non-AA): 71, done on 1/29/2022.  CA: 9.6, done on 7/13/2023.  Na: 139, done on 7/13/2023.  K: 4.2, done on 7/13/2023.  Cl: 106, done on 7/13/2023.  CO2: 25, done on 7/13/2023.  Last ALB was 4% done on 7/13/2023.     No results found.     Assessment/Plan:  #1.  Mild versus moderate persistent asthma  Continue Advair with albuterol as needed  Continue Singulair  I have refilled the above  If symptoms worsen from here, would obtain PFTs    Return in about 6 months (around  10/1/2024).    I spent 25 minutes obtaining and reviewing records, preparing for the visit including reviewing chart and prior testing, face to face time examining the patient and obtaining history, counseling, arranging and reviewing office-based testing, independently reviewing relevant studies and documentation exclusive of other billable procedures.      Matt Borjas MD  4/1/2024

## 2024-06-05 ENCOUNTER — APPOINTMENT (OUTPATIENT)
Dept: URBAN - METROPOLITAN AREA CLINIC 247 | Age: 44
Setting detail: DERMATOLOGY
End: 2024-06-05

## 2024-06-05 DIAGNOSIS — D485 NEOPLASM OF UNCERTAIN BEHAVIOR OF SKIN: ICD-10-CM

## 2024-06-05 DIAGNOSIS — L82.1 OTHER SEBORRHEIC KERATOSIS: ICD-10-CM

## 2024-06-05 DIAGNOSIS — L57.8 OTHER SKIN CHANGES DUE TO CHRONIC EXPOSURE TO NONIONIZING RADIATION: ICD-10-CM

## 2024-06-05 DIAGNOSIS — D18.0 HEMANGIOMA: ICD-10-CM

## 2024-06-05 PROBLEM — D18.01 HEMANGIOMA OF SKIN AND SUBCUTANEOUS TISSUE: Status: ACTIVE | Noted: 2024-06-05

## 2024-06-05 PROBLEM — D48.5 NEOPLASM OF UNCERTAIN BEHAVIOR OF SKIN: Status: ACTIVE | Noted: 2024-06-05

## 2024-06-05 PROCEDURE — 99203 OFFICE O/P NEW LOW 30 MIN: CPT | Mod: 25

## 2024-06-05 PROCEDURE — OTHER SHAVE REMOVAL: OTHER

## 2024-06-05 PROCEDURE — OTHER MIPS QUALITY: OTHER

## 2024-06-05 PROCEDURE — A4550 SURGICAL TRAYS: HCPCS

## 2024-06-05 PROCEDURE — OTHER COUNSELING: OTHER

## 2024-06-05 PROCEDURE — 11301 SHAVE SKIN LESION 0.6-1.0 CM: CPT

## 2024-06-05 ASSESSMENT — LOCATION SIMPLE DESCRIPTION DERM
LOCATION SIMPLE: ABDOMEN
LOCATION SIMPLE: UPPER BACK
LOCATION SIMPLE: INFERIOR FOREHEAD
LOCATION SIMPLE: RIGHT UPPER BACK

## 2024-06-05 ASSESSMENT — LOCATION DETAILED DESCRIPTION DERM
LOCATION DETAILED: SUPERIOR THORACIC SPINE
LOCATION DETAILED: RIGHT LATERAL UPPER BACK
LOCATION DETAILED: INFERIOR MID FOREHEAD
LOCATION DETAILED: EPIGASTRIC SKIN

## 2024-06-05 ASSESSMENT — LOCATION ZONE DERM
LOCATION ZONE: FACE
LOCATION ZONE: TRUNK

## 2024-06-05 NOTE — PROCEDURE: SHAVE REMOVAL
Biopsy Method: Personna blade
Was A Bandage Applied: Yes
Detail Level: Detailed
Render Post-Care Instructions In Note?: no
Anesthesia Type: 1% lidocaine with epinephrine
Medical Necessity Information: It is in your best interest to select a reason for this procedure from the list below. All of these items fulfill various CMS LCD requirements except the new and changing color options.
Anesthesia Volume In Cc: 0.3
Billing Type: Third-Party Bill
Body Location Override (Optional - Billing Will Still Be Based On Selected Body Map Location If Applicable): right mid back
Size Of Lesion In Cm (Required): 0.6
Lab Facility: [id number]
Medical Necessity Clause: This procedure was medically necessary because the lesion that was treated was:
Lab: -[id number]
Anticipated Plan (Based On Presumed Biopsy Results): The intent of today's procedure was to remove the entire atypical pigmented lesion in hopes that if the entire lesion is removed and any atypia is not significant, we can appropriately watch and monitor the site for recurrence.
Hemostasis: Drysol
Post-Care Instructions: I reviewed with the patient in detail post-care instructions. Patient is to keep the biopsy site dry overnight, and then apply Vaseline
Wound Care: Petrolatum
Path Notes (To The Dermatopathologist): check margins
Depth Of Shave: dermis
Notification Instructions: Patient will be notified of pathology results. However, patient instructed to call the office if not contacted within 2 weeks.
Consent was obtained from the patient. The risks and benefits to therapy were discussed in detail. Specifically, the risks of infection, scarring, bleeding, prolonged wound healing, incomplete removal, allergy to anesthesia, nerve injury and recurrence were addressed. Prior to the procedure, the treatment site was clearly identified and confirmed by the patient. All components of Universal Protocol/PAUSE Rule completed.

## 2024-06-24 DIAGNOSIS — J98.01 ACUTE BRONCHOSPASM: ICD-10-CM

## 2024-06-28 RX ORDER — FLUTICASONE PROPIONATE AND SALMETEROL 500; 50 UG/1; UG/1
1 POWDER RESPIRATORY (INHALATION) 2 TIMES DAILY
Qty: 60 EACH | Refills: 0 | Status: SHIPPED | OUTPATIENT
Start: 2024-06-28

## 2024-08-19 ENCOUNTER — TELEPHONE (OUTPATIENT)
Dept: FAMILY MEDICINE CLINIC | Facility: CLINIC | Age: 44
End: 2024-08-19

## 2024-08-19 NOTE — TELEPHONE ENCOUNTER
Patient's spouse informed me that Juliana got her flu shot done this past Friday which is August 16th.  Historical immunization was entered

## 2024-08-30 RX ORDER — MONTELUKAST SODIUM 10 MG/1
10 TABLET ORAL NIGHTLY
Qty: 90 TABLET | Refills: 3 | OUTPATIENT
Start: 2024-08-30

## 2024-09-11 ENCOUNTER — PATIENT MESSAGE (OUTPATIENT)
Dept: FAMILY MEDICINE CLINIC | Facility: CLINIC | Age: 44
End: 2024-09-11

## 2024-09-12 NOTE — TELEPHONE ENCOUNTER
From: Juliana Ness  To: Eyad Soraya  Sent: 9/11/2024 5:41 PM CDT  Subject: Sick    Leticia Castano , it’s Juliana Ness. Hope your doing well  I have been diagnosed with Covid and then bronchitis about 3 weeks ago. I was put on a steroid prednisone and started to feel btr. Now today I have been having a sore throat that is getting worse quickly and not going away I have no drainage. When I got Covid my throat was why I went in   Do you have any suggestions as to what I can do for my throat or if you think it’s still Covid symptoms?? I have already missed work and I’m trying to not have to take anymore days off  Appreciate you,  Juliana Ness

## 2024-11-01 ENCOUNTER — MED REC SCAN ONLY (OUTPATIENT)
Dept: FAMILY MEDICINE CLINIC | Facility: CLINIC | Age: 44
End: 2024-11-01

## 2024-11-25 ENCOUNTER — TELEPHONE (OUTPATIENT)
Dept: SURGERY | Facility: CLINIC | Age: 44
End: 2024-11-25

## 2024-11-25 NOTE — TELEPHONE ENCOUNTER
Patient LV for imaging orders. Called patient back, let her know we have not seen her in the office since 2022. Therefore we can not place orders until she is seen in office for a clinical exam . Transferred to  for next available appointment.

## 2024-12-09 PROBLEM — S73.192A LABRAL TEAR OF LEFT HIP JOINT: Status: ACTIVE | Noted: 2024-12-09

## 2025-01-09 ENCOUNTER — OFFICE VISIT (OUTPATIENT)
Dept: SURGERY | Facility: CLINIC | Age: 45
End: 2025-01-09
Payer: COMMERCIAL

## 2025-01-09 VITALS
SYSTOLIC BLOOD PRESSURE: 134 MMHG | OXYGEN SATURATION: 100 % | BODY MASS INDEX: 29 KG/M2 | WEIGHT: 169.19 LBS | TEMPERATURE: 98 F | DIASTOLIC BLOOD PRESSURE: 81 MMHG | RESPIRATION RATE: 19 BRPM | HEART RATE: 80 BPM

## 2025-01-09 DIAGNOSIS — N60.92 ATYPICAL LOBULAR HYPERPLASIA (ALH) OF LEFT BREAST: Primary | ICD-10-CM

## 2025-01-09 PROCEDURE — 3075F SYST BP GE 130 - 139MM HG: CPT

## 2025-01-09 PROCEDURE — 3079F DIAST BP 80-89 MM HG: CPT

## 2025-01-09 PROCEDURE — 99213 OFFICE O/P EST LOW 20 MIN: CPT

## 2025-01-10 NOTE — PROGRESS NOTES
Breast Surgery Surveillance Visit      History of Present Illness:   Ms. Juliana Ness is a 44 year old woman who presents with an imaging detected concern the left breast.  The patient denies any palpable masses, nipple discharge, skin changes or axillary symptoms.  She has no personal prior history of breast disease or biopsies.  She does not have any known family history of breast cancer.  She presented for her annual screening mammogram in August 6, 2022 and was noted to have heterogeneously dense breast tissue with calcifications and asymmetries of her left breast which additional imaging was recommended.  A left breast diagnostic evaluation took place in August 22, 2022 and confirmed an indeterminate group of calcifications in the left breast for which a stereotactic biopsy was recommended as well as an additional hypoechoic nodule measuring up to 7 mm at 7:00 thought to be related to a complicated cyst as well as a 6 mm benign-appearing nodule at 12:00 thought to be benign and a simple cyst at 9:00.  She underwent stereotactic biopsy of the calcifications on August 31, 2022 and was found to have atypical lobular hyperplasia as well as pseudo angiomatous stromal hyperplasia.  Due to lack of enhancement on breast MRI, plan was to continue monitoring this area with imaging.  She is here today for evaluation and recommendations for further therapy.        Past Medical History:    Abdominal pain    ADD (attention deficit disorder)    ADHD    Anxiety    Atypical lobular hyperplasia (ALH) of left breast    Back pain    Belching    Bipolar affective (HCC)    Bipolar disorder (HCC)    Bloating    Constipation    Depression    Diarrhea, unspecified    Dysmenorrhea    Fatigue    Feeling lonely    Fibroids    Frequent use of laxatives    Generalized anxiety disorder    History of depression    History of suicide attempt    x 3 (overdose on meds, 2020- [place] poisoning)    IFG (impaired fasting glucose)    Irregular bowel  habits    Left ovarian cyst    Leiomyoma of uterus, unspecified    Loss of appetite    Nausea    Non-seasonal allergic rhinitis due to pollen    OAB (overactive bladder)    OCD (obsessive compulsive disorder)    OCD--started on Effexor and referral to therapist--sees Una Barker---[phone]---taking prazosin for bad dreams    OCD (obsessive compulsive disorder)    OCD--started on medication and referral to therapist    Panic attacks    thought to have asthma--but it was panic attacks and SOB--PFT's see media-is WNL, off Asthma meds since early     Problems with swallowing    PTSD (post-traumatic stress disorder)    RAD (reactive airway disease) (HCC)    bronchitis needs an albuterol inhaler ~2x/yr.    Severe episode of recurrent major depressive disorder, without psychotic features (HCC)    Uncomfortable fullness after meals    Wears glasses       Past Surgical History:   Procedure Laterality Date    Breast biopsy Left 2022    pseudo-angiomatous stromal hyperplasia (PASH)    Cystoscopy,manipulatn  08/10/2018    Dr. Ross Silver gid & loclzj ndl/cath spi dx/ther njx  2014    Procedure: TRANSFORAMINAL EPIDURAL - LUMBAR;  Surgeon: Kaden Whatley MD;  Location: [facility]    Hand/finger surgery unlisted Right     Injection, w/wo contrast, dx/therapeutic substance, epidural/subarachnoid; lumbar/sacral  2014    Procedure: TRANSFORAMINAL EPIDURAL - LUMBAR;  Surgeon: Kaden Whatley MD;  Location: [facility]    M-sedaj by sm phys perfrmg svc 5+ yr  2014    Procedure: TRANSFORAMINAL EPIDURAL - LUMBAR;  Surgeon: Kaden Whatley MD;  Location: [facility]    Lola biopsy stereo nodule 1 site left (cpt=19081)      Other surgical history      Right shoulder.    Shoulder arthroscopy Right 05/15/2023    SLAP repair    Us breast biopsy Left 2022    [place] teeth removed         Gynecological History:  Pt is a   She achieved  menarche at age 15 and LMP 9/10/2022  She denies any history of hormone replacement therapy   She denies any history of oral contraceptive use *.  She denies infertility treatment to achieve pregnancy.    Medications:     methylphenidate ER (CONCERTA) 54 MG Oral Tab CR Take 1 tablet (54 mg total) by mouth every morning. 30 tablet 0    ALPRAZolam 1 MG Oral Tab TAKE 1 TABLET BY MOUTH IN THE AFTERNOON AS NEEDED FOR ANXIETY 30 tablet 0    ALPRAZolam ER 2 MG Oral Tablet 24 Hr TAKE 1 TABLET BY MOUTH EVERY MORNING 30 tablet 0    ALPRAZOLAM ER 1 MG Oral Tablet 24 Hr TAKE 1 TABLET(1 MG) BY MOUTH AT BEDTIME 30 tablet 0    gabapentin 100 MG Oral Cap TAKE 1 CAPSULE BY MOUTH EVERY MORNING AND AFTERNOON AND TAKE 4 CAPSULES WITH 800MG CAPSULE(1200MG TOTAL) AT BEDTIME 180 capsule 0    gabapentin 800 MG Oral Tab TAKE ONE TABLET BY MOUTH THREE TIMES DAILY WITH A 100MG CAPSULE FOR A TOTAL 900MG THREE TIMES DAILY 90 tablet 2    busPIRone HCl 30 MG Oral Tab Take 1 tablet (30 mg total) by mouth 3 (three) times daily. 270 tablet 0    ARIPiprazole 20 MG Oral Tab Take 1 tablet (20 mg total) by mouth daily. 30 tablet 2    WIXELA INHUB 500-50 MCG/ACT Inhalation Aerosol Powder, Breath Activated INHALE 1 PUFF INTO THE LUNGS TWICE DAILY 60 each 0    FLUoxetine HCl 40 MG Oral Cap Take 1 capsule (40 mg total) by mouth daily. 90 capsule 1    lamoTRIgine 200 MG Oral Tab TAKE 1 TABLET BY MOUTH IN THE MORNING AND TAKE 1 TABLET BY MOUTH AT DINNER 180 tablet 1    fluticasone-salmeterol (ADVAIR HFA) 230-21 MCG/ACT Inhalation Aerosol Inhale 2 puffs into the lungs 2 (two) times daily. 12 g 11    albuterol 108 (90 Base) MCG/ACT Inhalation Aero Soln Inhale 2 puffs into the lungs every 4 (four) hours as needed for Wheezing or Shortness of Breath (cough). 1 each 11    montelukast 10 MG Oral Tab Take 1 tablet (10 mg total) by mouth nightly. 90 tablet 3    polymyxin B-trimethoprim 66120-9.1 UNIT/ML-% Ophthalmic Solution Place 1 drop into both eyes every 4 (four)  hours.      dexamethasone 0.1 % Ophthalmic Solution Place 1 drop into both eyes every 3 (three) hours. X7-10 days 5 mL 0    Benadryl/Maalox/Lidocaine 1:1:1 solution Take 5-10 mL by mouth TID AC&HS. Swish and swallow and spit 90 mL 0    traZODone 50 MG Oral Tab Take 0.5 tablets (25 mg total) by mouth nightly. After 4 nights can increase to 1 tab (50 mg total) if needed for sleep 30 tablet 0    ondansetron 4 MG Oral Tablet Dispersible Take 1 tablet (4 mg total) by mouth every 8 (eight) hours as needed for Nausea. 10 tablet 0    albuterol (2.5 MG/3ML) 0.083% Inhalation Nebu Soln Take 3 mL (2.5 mg total) by nebulization every 4 (four) hours as needed for Wheezing or Shortness of Breath. 75 mL 3    SUMAtriptan (IMITREX) 25 MG Oral Tab Take 1 tablet (25 mg total) by mouth every 2 (two) hours as needed for Migraine. Use at onset; repeat once after 2 HRS-ONLY 2 IN 24 HR MAX 9 tablet 1       Allergies:    No Known Allergies    Family History:   Family History   Problem Relation Age of Onset    Hypertension Father     PTSD Father     Skin cancer Father 60        Melanoma    Other (Other) Father     Cancer Father 60    Diabetes Mother     Hypertension Mother     Asthma Mother         COPD    Cancer Brother         testicular cancer    Bipolar Disorder Brother     Other (Other) Brother     Depression Brother        She is not of Ashkenazi Jehovah's witness ancestry.    Social History:  History   Alcohol Use    1.0 standard drink of alcohol/week    1 Standard drinks or equivalent per week     Comment: socially       History   Smoking Status    Never   Smokeless Tobacco    Never     Ms. Juliana Ness is  with 0 children. She has 2 siblings. She is currently Employed full-time      Review of Systems:  General:   The patient denies, fever, chills, night sweats, +fatigue, generalized weakness, change in appetite or weight loss.    HEENT:     The patient denies eye irritation, cataracts, redness, glaucoma, yellowing of the eyes,  change in vision, color blindness, or +wearing contacts/glasses. The patient denies hearing loss, ringing in the ears, ear drainage, earaches, nasal congestion, nose bleeds, snoring, pain in mouth/throat, hoarseness, change in voice, facial trauma.    Respiratory:  The patient denies chronic cough, phlegm, hemoptysis, pleurisy/chest pain, pneumonia, asthma, wheezing, difficulty in breathing with exertion, emphysema, chronic bronchitis, shortness of breath or abnormal sound when breathing.     Cardiovascular:  There is no history of chest pain, chest pressure/discomfort, palpitations, irregular heartbeat, fainting or near-fainting, difficulty breathing when lying flat, SOB/Coughing at night, swelling of the legs or chest pain while walking.    Breasts:  See history of present illness    Gastrointestinal:     There is no history of difficulty or pain with swallowing, reflux symptoms, vomiting, dark or bloody stools, constipation, yellowing of the skin, indigestion, nausea, change in bowel habits, diarrhea, abdominal pain or vomiting blood.     Genitourinary:  The patient denies +frequent urination, needing to get up at night to urinate, urinary hesitancy or retaining urine, painful urination, urinary incontinence, decreased urine stream, blood in the urine or vaginal/penile discharge.    Skin:    The patient denies rash, itching, skin lesions, dry skin, change in skin color or change in moles.     Hematologic/Lymphatic:  The patient denies easily bruising or bleeding or persistent swollen glands or lymph nodes.     Musculoskeletal:  The patient denies muscle aches/pain, joint pain, stiff joints, neck pain, back pain or bone pain.    Neuropsychiatric:  There is no history of migraines or severe headaches, seizure/epilepsy, speech problems, coordination problems, trembling/tremors, fainting/black outs, dizziness, memory problems, loss of sensation/numbness, problems walking, weakness, tingling or burning in hands/feet.  There is no history of abusive relationship, +bipolar disorder, sleep disturbance, +anxiety, +depression or +feeling of despair.    Endocrine:    There is no history of poor/slow wound healing, weight loss/gain, fertility or hormone problems, cold intolerance, thyroid disease.     Allergic/Immunologic:  There is no history of hives, hay fever, angioedema or anaphylaxis.    /81 (BP Location: Right arm, Patient Position: Sitting, Cuff Size: adult)   Pulse 80   Temp 97.7 °F (36.5 °C) (Temporal)   Resp 19   Wt 76.7 kg (169 lb 3.2 oz)   LMP 02/24/2024 (Approximate)   SpO2 100%   BMI 29.04 kg/m²     Physical Exam:  The patient is an alert, oriented, well-nourished and  well-developed woman who appears her stated age. Her speech patterns and movements are normal. Her affect is appropriate.    HEENT: The head is normocephalic. The neck is supple. The thyroid is not enlarged and is without palpable masses/nodules. There are no palpable masses. The trachea is in the midline. Conjunctiva are clear, non-icteric.    Chest: The chest expands symmetrically. The lungs are clear to auscultation.    Heart: The rhythm is regular.  There are no murmurs, rubs, gallops or thrills.    Breasts:  Her breasts are symmetrical with a cup size 36B.  Right breast: The skin, nipple ,and areola appear normal. There is no skin dimpling with movement of the pectoralis. There is no nipple retraction. No nipple discharge can be elicited. The parenchyma is mildly nodular. There are no dominant masses in the breast. The axillary tail is normal.  Left breast:   The skin, nipple, and areola appear normal. There is no skin dimpling with movement of the pectoralis. There is no nipple retraction. No nipple discharge can be elicited. The parenchyma is mildly nodular. There are no dominant masses in the breast. The axillary tail is normal.    Abdomen:  The abdomen is soft, flat and non tender. The liver is not enlarged. There are no palpable  masses.    Lymph Nodes:  The supraclavicular, axillary and cervical regions are free of significant lymphadenopathy.    Back: There is no vertebral column tenderness.    Skin: The skin appears normal. There are no suspicious appearing rashes or lesions.    Extremities: The extremities are without deformity, cyanosis or edema.    Impression:   Ms. Juliana Ness is a 44 year old woman presents with dense breasts and biopsy confirmed left breast atypical lobular hyperplasia as well as left breast nodules.    Discussion and Plan:  I had a discussion with the Patient regarding her breast exam.  On exam today I found no evidence of malignancy bilaterally.  She is due for a bilateral diagnostic mammogram now.  If her mammogram is stable we will plan for a breast MRI in 6 months.  The significance and implications of ALH found on core biopsy with regard to future breast cancer risk was discussed with the patient. I explained that surigical excision of the area in the setting of ALH is not routinely advocated as this is thought to be a marker of increased risk rather than a true precursor lesion. I discussed that her risk of invasive disease is conferred bilaterally and that subsequent cancers can be of both the ductal and lobular phenotype. Options with regard to management can include: (1) lifelong surveillance with the goal of detecting subsequent malignancy at an early stage; with or without (2) chemoprevention.     The patient may require left breast wire localized excisional biopsy of the atypia if she is found to have additional enhancement at the area of the biopsy marker.  I will contact her with the results of the imaging when they are available to confirm her optimal plan. She was given ample opportunity for questions and those questions were answered to her satisfaction. She has been  encouraged to contact the office with any questions or concerns prior to her next appointment.

## 2025-01-13 ENCOUNTER — HOSPITAL ENCOUNTER (OUTPATIENT)
Dept: MAMMOGRAPHY | Age: 45
Discharge: HOME | End: 2025-01-13
Payer: COMMERCIAL

## 2025-01-13 ENCOUNTER — HOSPITAL ENCOUNTER (OUTPATIENT)
Dept: ULTRASOUND IMAGING | Age: 45
Discharge: HOME | End: 2025-01-13
Payer: COMMERCIAL

## 2025-01-13 DIAGNOSIS — N60.92 ATYPICAL LOBULAR HYPERPLASIA (ALH) OF LEFT BREAST: ICD-10-CM

## 2025-01-13 PROCEDURE — 76642 ULTRASOUND BREAST LIMITED: CPT

## 2025-01-13 PROCEDURE — 77062 BREAST TOMOSYNTHESIS BI: CPT

## 2025-01-13 PROCEDURE — 77066 DX MAMMO INCL CAD BI: CPT

## 2025-01-23 ENCOUNTER — TELEPHONE (OUTPATIENT)
Dept: GENERAL RADIOLOGY | Facility: HOSPITAL | Age: 45
End: 2025-01-23

## 2025-01-23 ENCOUNTER — HOSPITAL ENCOUNTER (OUTPATIENT)
Dept: MRI IMAGING | Facility: HOSPITAL | Age: 45
Discharge: HOME | End: 2025-01-23
Payer: COMMERCIAL

## 2025-01-23 DIAGNOSIS — R92.8 ABNORMAL MRI, BREAST: Primary | ICD-10-CM

## 2025-01-23 DIAGNOSIS — N60.92 ATYPICAL LOBULAR HYPERPLASIA (ALH) OF LEFT BREAST: ICD-10-CM

## 2025-01-23 PROCEDURE — A9575 INJ GADOTERATE MEGLUMI 0.1ML: HCPCS

## 2025-01-23 PROCEDURE — 77049 MRI BREAST C-+ W/CAD BI: CPT

## 2025-01-23 RX ORDER — GADOTERATE MEGLUMINE 376.9 MG/ML
20 INJECTION INTRAVENOUS
Status: COMPLETED | OUTPATIENT
Start: 2025-01-23 | End: 2025-01-23

## 2025-01-23 RX ADMIN — GADOTERATE MEGLUMINE 16 ML: 376.9 INJECTION INTRAVENOUS at 08:11:00

## 2025-01-24 NOTE — IMAGING NOTE
0815: Spoke with Juliana Ness at this time.  Introduced myself as one of the breast nurse coordinators at [facility].  Discussed MRI breast imaging recommendations as follows:   CONCLUSION:    There is a new 1 cm mass in the LEFT breast at the 5 o'clock position posteriorly.  While it has imaging characteristics suggestive of fibroadenoma, given that it appears to be new,   second-look ultrasound and biopsy is recommended.  If no sonographic correlate is identified, then MRI guided biopsy would be recommended (BI-RADS 4A).      No MRI findings suspicious for malignancy in the RIGHT breast.       RECOMMENDATIONS:    ULTRASOUND: LEFT BREAST  --We will call the patient back for an ultrasound and provide an additional report.       SEE EMR FOR COMPLETE IMAGING REPORT    Juliana Ness aware of the above-provider notification.  Orders in epic      Medications and allergies reviewed:  NKDA reported  Current Outpatient Medications   Medication Sig Dispense Refill    methylphenidate ER (CONCERTA) 54 MG Oral Tab CR Take 1 tablet (54 mg total) by mouth every morning. 30 tablet 0    ALPRAZolam 1 MG Oral Tab TAKE 1 TABLET BY MOUTH IN THE AFTERNOON AS NEEDED FOR ANXIETY 30 tablet 0    ALPRAZolam ER 2 MG Oral Tablet 24 Hr TAKE 1 TABLET BY MOUTH EVERY MORNING 30 tablet 0    ALPRAZOLAM ER 1 MG Oral Tablet 24 Hr TAKE 1 TABLET(1 MG) BY MOUTH AT BEDTIME 30 tablet 0    gabapentin 100 MG Oral Cap TAKE 1 CAPSULE BY MOUTH EVERY MORNING AND AFTERNOON AND TAKE 4 CAPSULES WITH 800MG CAPSULE(1200MG TOTAL) AT BEDTIME 180 capsule 0    gabapentin 800 MG Oral Tab TAKE ONE TABLET BY MOUTH THREE TIMES DAILY WITH A 100MG CAPSULE FOR A TOTAL 900MG THREE TIMES DAILY 90 tablet 2    busPIRone HCl 30 MG Oral Tab Take 1 tablet (30 mg total) by mouth 3 (three) times daily. 270 tablet 0    ARIPiprazole 20 MG Oral Tab Take 1 tablet (20 mg total) by mouth daily. 30 tablet 2    WIXELA INHUB 500-50 MCG/ACT Inhalation Aerosol Powder, Breath Activated INHALE  1 PUFF INTO THE LUNGS TWICE DAILY 60 each 0    FLUoxetine HCl 40 MG Oral Cap Take 1 capsule (40 mg total) by mouth daily. 90 capsule 1    lamoTRIgine 200 MG Oral Tab TAKE 1 TABLET BY MOUTH IN THE MORNING AND TAKE 1 TABLET BY MOUTH AT DINNER 180 tablet 1    fluticasone-salmeterol (ADVAIR HFA) 230-21 MCG/ACT Inhalation Aerosol Inhale 2 puffs into the lungs 2 (two) times daily. 12 g 11    albuterol 108 (90 Base) MCG/ACT Inhalation Aero Soln Inhale 2 puffs into the lungs every 4 (four) hours as needed for Wheezing or Shortness of Breath (cough). 1 each 11    montelukast 10 MG Oral Tab Take 1 tablet (10 mg total) by mouth nightly. 90 tablet 3    polymyxin B-trimethoprim 51291-4.1 UNIT/ML-% Ophthalmic Solution Place 1 drop into both eyes every 4 (four) hours.      dexamethasone 0.1 % Ophthalmic Solution Place 1 drop into both eyes every 3 (three) hours. X7-10 days 5 mL 0    Benadryl/Maalox/Lidocaine 1:1:1 solution Take 5-10 mL by mouth TID AC&HS. Swish and swallow and spit 90 mL 0    traZODone 50 MG Oral Tab Take 0.5 tablets (25 mg total) by mouth nightly. After 4 nights can increase to 1 tab (50 mg total) if needed for sleep 30 tablet 0    ondansetron 4 MG Oral Tablet Dispersible Take 1 tablet (4 mg total) by mouth every 8 (eight) hours as needed for Nausea. 10 tablet 0    albuterol (2.5 MG/3ML) 0.083% Inhalation Nebu Soln Take 3 mL (2.5 mg total) by nebulization every 4 (four) hours as needed for Wheezing or Shortness of Breath. 75 mL 3    SUMAtriptan (IMITREX) 25 MG Oral Tab Take 1 tablet (25 mg total) by mouth every 2 (two) hours as needed for Migraine. Use at onset; repeat once after 2 HRS-ONLY 2 IN 24 HR MAX 9 tablet 1       Juliana Ness denies the use of prescribed anticoagulants, denies known bleeding disorders and/or liver disease, denies chemotherapy    Procedures explained. Juliana Ness verbalized understanding. No questions at this time.     Juliana Grover instructed to take 1000 mg of acetaminophen on the  day of the biopsy, eat a light meal, and bring or wear a sport bra.  Post biopsy care and instruction reviewed: including no lifting more than five pounds, no upper body exercise, icing of biopsy site, no submerging in water.  Juliana Ness verbalized understanding.    Juliana Ness transferred to the breast center schedulers for appointments.

## 2025-01-27 ENCOUNTER — TELEPHONE (OUTPATIENT)
Dept: SURGERY | Facility: CLINIC | Age: 45
End: 2025-01-27

## 2025-01-27 NOTE — TELEPHONE ENCOUNTER
Called radiology department and talk to enedina to see if the patient breast biopsy can be scheduled earlier than 2/4. Informed enedina that patient is scheduled to have hip surgery on 2/5 and patient wants to have her breast biopsy earlier which is scheduled for 2/4. Enedina stated that she will send a massage to the  and they will call the patient and work it out.

## 2025-01-27 NOTE — TELEPHONE ENCOUNTER
Patient called stating that she talk to someone from our office to see if her breast biopsy can be scheduled earlier than 2/4. Patient stated that she has a hip surgery scheduled for 2/5 and the person she spoke to was going to reach out to radiology department to check if her biopsy can be rescheduled for an earlier date. Informed patient that I will check with the team and will call her back. Patient verbalized understanding.

## 2025-01-27 NOTE — TELEPHONE ENCOUNTER
Called patient and informed patient that we reached out to the radiology department and someone from the radiology department is going to reach out to her soon regarding her biopsy date. Patient was very appreciative of the help. Patient questions were answered to the best of my ability. Patient verbalized understanding.

## 2025-01-30 ENCOUNTER — HOSPITAL ENCOUNTER (OUTPATIENT)
Dept: MAMMOGRAPHY | Facility: HOSPITAL | Age: 45
Discharge: HOME | End: 2025-01-30
Payer: COMMERCIAL

## 2025-01-30 DIAGNOSIS — R92.8 ABNORMAL MRI, BREAST: ICD-10-CM

## 2025-01-30 PROCEDURE — 77065 DX MAMMO INCL CAD UNI: CPT

## 2025-01-30 PROCEDURE — 76642 ULTRASOUND BREAST LIMITED: CPT

## 2025-01-30 PROCEDURE — 88305 TISSUE EXAM BY PATHOLOGIST: CPT

## 2025-01-30 PROCEDURE — 19083 BX BREAST 1ST LESION US IMAG: CPT

## 2025-01-30 NOTE — DISCHARGE INSTRUCTIONS
Discharge Instructions  Stereotactic / Ultrasound / MRI Core Biopsy     Place an ice pack on top of the biopsy site in your bra OR the folds of the Ace wrap for 10-15 minutes every hour until bedtime for your comfort and to decrease bleeding.     Keep your supportive bra OR the Ace wrap in place for 24 hours after your biopsy. This compression decreases bleeding and breast movement for your comfort. Wear a supportive bra for the next couple days for comfort (as well as for sleeping)     No bath or shower during the first 24 hours after biopsy. After this time, you may take a bath or shower. It’s okay if the steri-strips get wet but don’t soak them.   No saunas, hot tubs, or swimming pools until the steri-strips fall off (5-7days).  This prevents infection and allows time for the area to completely close and heal.     DO NOT remove the steri-strips. They will fall off in 5 days to two weeks. If any type of irritation (redness, itching, OR blisters) develops in the area around the steri-strips, remove them gently. Keep the area clean and dry.     It is normal to have mild discomfort and bruising at the biopsy site.      You may take Tylenol as needed for discomfort.     DO NOT participate in strenuous activity (aerobics, heavy lifting, housework, gardening, etc.) 48 hours after your biopsy to prevent bleeding.     You will receive results typically within 2-3 business days. Occasionally, the specimen is sent off-site for a 2nd opinion. You will be notified when this occurs as this will delay your results.     If you have any questions about the procedure or your results, please contact the Breast Care Coordinator Nurse at [phone]. (M-F 7:30-4)    If you are having a MRI Breast Biopsy or an Ultrasound guided biopsy, you will be billed for the biopsy and a unilateral mammogram separately.    A 6-month or one year follow-up Diagnostic Mammogram/Ultrasound will be recommended to document stability of the biopsy  site. It may be scheduled at [facility] or [facility] by calling [phone]. You will need an order for this exam from your referring physician.       5/2024

## 2025-02-25 ENCOUNTER — HOSPITAL ENCOUNTER (OUTPATIENT)
Dept: ULTRASOUND IMAGING | Facility: HOSPITAL | Age: 45
Discharge: HOME | End: 2025-02-25
Attending: ORTHOPAEDIC SURGERY
Payer: COMMERCIAL

## 2025-02-25 DIAGNOSIS — M79.662 PAIN OF LEFT LOWER LEG: ICD-10-CM

## 2025-02-25 PROCEDURE — 93971 EXTREMITY STUDY: CPT | Performed by: ORTHOPAEDIC SURGERY

## 2025-02-26 ENCOUNTER — HOSPITAL ENCOUNTER (EMERGENCY)
Age: 45
Discharge: HOME | End: 2025-02-26
Attending: EMERGENCY MEDICINE
Payer: COMMERCIAL

## 2025-02-26 ENCOUNTER — TELEPHONE (OUTPATIENT)
Dept: FAMILY MEDICINE CLINIC | Facility: CLINIC | Age: 45
End: 2025-02-26

## 2025-02-26 VITALS
OXYGEN SATURATION: 96 % | DIASTOLIC BLOOD PRESSURE: 67 MMHG | TEMPERATURE: 98 F | SYSTOLIC BLOOD PRESSURE: 111 MMHG | RESPIRATION RATE: 16 BRPM | BODY MASS INDEX: 29 KG/M2 | WEIGHT: 169.06 LBS | HEART RATE: 77 BPM

## 2025-02-26 DIAGNOSIS — I82.4Z2 ACUTE DEEP VEIN THROMBOSIS (DVT) OF DISTAL VEIN OF LEFT LOWER EXTREMITY (HCC): Primary | ICD-10-CM

## 2025-02-26 LAB
ALBUMIN SERPL-MCNC: 4.5 G/DL (ref 3.2–4.8)
ALBUMIN/GLOB SERPL: 1.6 {RATIO} (ref 1–2)
ALP LIVER SERPL-CCNC: 80 U/L
ALT SERPL-CCNC: 28 U/L
ANION GAP SERPL CALC-SCNC: 5 MMOL/L (ref 0–18)
AST SERPL-CCNC: 27 U/L (ref ?–34)
BASOPHILS # BLD AUTO: 0.03 X10(3) UL (ref 0–0.2)
BASOPHILS NFR BLD AUTO: 0.4 %
BILIRUB SERPL-MCNC: 0.3 MG/DL (ref 0.3–1.2)
BUN BLD-MCNC: 13 MG/DL (ref 9–23)
CALCIUM BLD-MCNC: 9.8 MG/DL (ref 8.7–10.6)
CHLORIDE SERPL-SCNC: 106 MMOL/L (ref 98–112)
CO2 SERPL-SCNC: 28 MMOL/L (ref 21–32)
CREAT BLD-MCNC: 0.95 MG/DL
EGFRCR SERPLBLD CKD-EPI 2021: 75 ML/MIN/1.73M2 (ref 60–?)
EOSINOPHIL # BLD AUTO: 0.09 X10(3) UL (ref 0–0.7)
EOSINOPHIL NFR BLD AUTO: 1.3 %
ERYTHROCYTE [DISTWIDTH] IN BLOOD BY AUTOMATED COUNT: 13.8 %
GLOBULIN PLAS-MCNC: 2.9 G/DL (ref 2–3.5)
GLUCOSE BLD-MCNC: 69 MG/DL (ref 70–99)
HCT VFR BLD AUTO: 38.8 %
HGB BLD-MCNC: 13.4 G/DL
IMM GRANULOCYTES # BLD AUTO: 0.03 X10(3) UL (ref 0–1)
IMM GRANULOCYTES NFR BLD: 0.4 %
LYMPHOCYTES # BLD AUTO: 1.88 X10(3) UL (ref 1–4)
LYMPHOCYTES NFR BLD AUTO: 26.3 %
MCH RBC QN AUTO: 30.5 PG (ref 26–34)
MCHC RBC AUTO-ENTMCNC: 34.5 G/DL (ref 31–37)
MCV RBC AUTO: 88.4 FL
MONOCYTES # BLD AUTO: 0.58 X10(3) UL (ref 0.1–1)
MONOCYTES NFR BLD AUTO: 8.1 %
NEUTROPHILS # BLD AUTO: 4.54 X10 (3) UL (ref 1.5–7.7)
NEUTROPHILS # BLD AUTO: 4.54 X10(3) UL (ref 1.5–7.7)
NEUTROPHILS NFR BLD AUTO: 63.5 %
OSMOLALITY SERPL CALC.SUM OF ELEC: 286 MOSM/KG (ref 275–295)
PLATELET # BLD AUTO: 276 10(3)UL (ref 150–450)
POTASSIUM SERPL-SCNC: 4.4 MMOL/L (ref 3.5–5.1)
PROT SERPL-MCNC: 7.4 G/DL (ref 5.7–8.2)
RBC # BLD AUTO: 4.39 X10(6)UL
SODIUM SERPL-SCNC: 139 MMOL/L (ref 136–145)
WBC # BLD AUTO: 7.2 X10(3) UL (ref 4–11)

## 2025-02-26 PROCEDURE — 80053 COMPREHEN METABOLIC PANEL: CPT | Performed by: EMERGENCY MEDICINE

## 2025-02-26 PROCEDURE — 99283 EMERGENCY DEPT VISIT LOW MDM: CPT

## 2025-02-26 PROCEDURE — 85025 COMPLETE CBC W/AUTO DIFF WBC: CPT | Performed by: EMERGENCY MEDICINE

## 2025-02-26 PROCEDURE — 36415 COLL VENOUS BLD VENIPUNCTURE: CPT

## 2025-02-26 PROCEDURE — 99284 EMERGENCY DEPT VISIT MOD MDM: CPT

## 2025-02-26 RX ORDER — HYDROCODONE BITARTRATE AND ACETAMINOPHEN 5; 325 MG/1; MG/1
1 TABLET ORAL EVERY 4 HOURS PRN
Qty: 12 TABLET | Refills: 0 | Status: SHIPPED | OUTPATIENT
Start: 2025-02-26

## 2025-02-26 RX ORDER — HYDROCODONE BITARTRATE AND ACETAMINOPHEN 5; 325 MG/1; MG/1
2 TABLET ORAL ONCE
Status: COMPLETED | OUTPATIENT
Start: 2025-02-26 | End: 2025-02-26

## 2025-02-26 NOTE — ED INITIAL ASSESSMENT (HPI)
Had US yesterday and dx with DVT in left leg. Told to come here for further testing and treatment because her pain has extended to her left groin.

## 2025-02-26 NOTE — ED PROVIDER NOTES
Patient Seen in: [facility] In [place]      History     Chief Complaint   Patient presents with    Deep Vein Thrombosis     Stated Complaint: diag left calf blood clot sent for futher workup    Subjective:   HPI      Patient recently had a hip surgery.  For the last day she did have pain in her left calf.  She had outpatient ultrasound done  yesterday afternoon that showed a left gastroc DVT.    She has pain at that site just rating up to her knee.  She has had pain in her groin since the ultrasound done yesterday, she states this is from the ultrasonographer doing the imaging.  She denies any chest pain, shortness of breath, palpitations, lightheadedness, syncope or near syncope.        Objective:     Past Medical History:    Abdominal pain    ADD (attention deficit disorder)    ADHD    Anxiety    Atypical lobular hyperplasia (ALH) of left breast    Back pain    Belching    Bipolar affective (HCC)    Bipolar disorder (HCC)    Bloating    Constipation    Depression    Diarrhea, unspecified    Dysmenorrhea    Fatigue    Feeling lonely    Fibroids    Frequent use of laxatives    Generalized anxiety disorder    History of depression    History of suicide attempt    x 3 (overdose on meds, 2020- CO poisoning)    IFG (impaired fasting glucose)    Irregular bowel habits    Left ovarian cyst    Leiomyoma of uterus, unspecified    Loss of appetite    Nausea    Non-seasonal allergic rhinitis due to pollen    OAB (overactive bladder)    OCD (obsessive compulsive disorder)    OCD--started on Effexor and referral to therapist--sees Una Barker--MELANI [phone]---taking prazosin for bad dreams    OCD (obsessive compulsive disorder)    OCD--started on medication and referral to therapist    Panic attacks    thought to have asthma--but it was panic attacks and SOB--PFT's see media-is WNL, off Asthma meds since early 2011    Problems with swallowing    PTSD (post-traumatic stress disorder)    RAD (reactive  airway disease) (HCC)    bronchitis needs an albuterol inhaler ~2x/yr.    Severe episode of recurrent major depressive disorder, without psychotic features (HCC)    Uncomfortable fullness after meals    Wears glasses              Past Surgical History:   Procedure Laterality Date    Breast biopsy Left 11/23/2022    pseudo-angiomatous stromal hyperplasia (PASH)    Breast biopsy Left 01/30/2025    fibroadenoma, benign    Cystoscopy,manipulatn  08/10/2018    Dr. Ross Silver gid & loclzj ndl/cath spi dx/ther njx  04/25/2014    Procedure: TRANSFORAMINAL EPIDURAL - LUMBAR;  Surgeon: Kaden Whatley MD;  Location: [facility]    Hand/finger surgery unlisted Right     Injection, w/wo contrast, dx/therapeutic substance, epidural/subarachnoid; lumbar/sacral  04/25/2014    Procedure: TRANSFORAMINAL EPIDURAL - LUMBAR;  Surgeon: Kaden Whatley MD;  Location: [facility]    M-sedaj by  phys perfrmg svc 5+ yr  04/25/2014    Procedure: TRANSFORAMINAL EPIDURAL - LUMBAR;  Surgeon: Kaden Whatley MD;  Location: [facility]    Lola biopsy stereo nodule 1 site left (cpt=19081)      Other surgical history      Right shoulder.    Shoulder arthroscopy Right 05/15/2023    SLAP repair    Us breast biopsy Left 08/31/2022    [place] teeth removed                  Social History     Socioeconomic History    Marital status:    Tobacco Use    Smoking status: Never    Smokeless tobacco: Never   Vaping Use    Vaping status: Never Used   Substance and Sexual Activity    Alcohol use: Yes     Alcohol/week: 1.0 standard drink of alcohol     Types: 1 Standard drinks or equivalent per week     Comment: socially    Drug use: Never    Sexual activity: Not Currently     Partners: Female   Other Topics Concern    Caffeine Concern Yes     Comment: coffee     Exercise Yes     Comment: 5-6 x week, weights, running    Seat Belt Yes   Social History Narrative    EtOH--1 drink 1x/week or 1  drink every 2 weeks                  Physical Exam     ED Triage Vitals [02/26/25 0941]   BP (!) 170/100   Pulse 92   Resp 16   Temp 97.6 °F (36.4 °C)   Temp src Temporal   SpO2 98 %   O2 Device None (Room air)       Current Vitals:   Vital Signs  BP: 111/67  Pulse: 77  Resp: 16  Temp: 97.6 °F (36.4 °C)  Temp src: Temporal    Oxygen Therapy  SpO2: 96 %  O2 Device: None (Room air)        Physical Exam  Physical Exam   Constitutional: Awake, alert, well appearing  Head: Normocephalic and atraumatic.   Eyes: Conjunctivae are normal. Pupils are equal, round, and reactive to light.   Neck: Normal range of motion. No JVD  Cardiovascular: Normal rate, regular rhythm  Pulmonary/Chest: Normal effort.  No accessory muscle use.  No cyanosis.  Abdominal: Soft. Not distended.  Neurological: Pt is alert and oriented to person, place, and time. no cranial nerve deficits. Speech fluent      Left calf does seem mildly swollen compared to the opposite side but has good color, brisk cap refill and easily palpated pulses at the DP and PT.  Slightly tender to palpation in the calf.        ED Course     Labs Reviewed   COMP METABOLIC PANEL (14) - Abnormal; Notable for the following components:       Result Value    Glucose 69 (*)     All other components within normal limits   CBC WITH DIFFERENTIAL WITH PLATELET   RAINBOW DRAW BLUE                    Outpatient ultrasound records Reviewed, she had a 6 cm long clot in her left gastrocnemius vein       MDM        -Differential: DVT, superficial thrombophlebitis,  limb threatening phlegmasia all considered  -Clinically does not have phlegmasia  -No symptoms to suggest PE    -Home with Eliquis.      -Discussed with , will ensure supply and coverage.  -Return precautions discussed in detail with patient and spouse at bedside        *Discussion of ongoing management of this patient's care included:   *External charts reviewed: Ultrasound as noted above    Shared  decision making was done by: patient, myself.        Medical Decision Making      Disposition and Plan     Clinical Impression:  1. Acute deep vein thrombosis (DVT) of distal vein of left lower extremity (HCC)         Disposition:  Discharge  2/26/2025 11:34 am    Follow-up:  Deanna Padron MD  [id number] [address]  [phone]    Follow up            Medications Prescribed:  Discharge Medication List as of 2/26/2025 11:45 AM        START taking these medications    Details   apixaban 5 MG Oral Tab Take 2 tabs (10mg) by mouth twice daily for 7 days, then take 1 tab (5mg) by mouth twice daily thereafter., Normal, Disp-74 tablet, R-0      HYDROcodone-acetaminophen 5-325 MG Oral Tab Take 1 tablet by mouth every 4 (four) hours as needed for Pain., Normal, Disp-12 tablet, R-0                 Supplementary Documentation:

## 2025-02-26 NOTE — TELEPHONE ENCOUNTER
Spoke to Augusto Bernabe at [facility], advised him that we sent patient to ER for evaluation.

## 2025-02-26 NOTE — TELEPHONE ENCOUNTER
Spoke with patient, c/o pain left lower leg. Discussed results of US with patient, advised to be evaluated in the ER for full work up. Patient verbalized full understanding and agreement. All questions answered.

## 2025-02-26 NOTE — TELEPHONE ENCOUNTER
Don with [facility] is calling to let Primary Care Provider know patient was sent for US and has a DVT in left calf.  No shortness of breath or chest pain.    Please call Don on were to go from here.  PH. [phone]

## 2025-02-26 NOTE — TELEPHONE ENCOUNTER
See phone message below:    Last visit 3/7/24 Telemedicine with Dr. Mayen    Last in office OV 2/26/24    US done 2/25/25, ordered by ortho.  I don't see that has a history of DVT.   Schedule OV to discuss?

## 2025-02-27 PROBLEM — I82.409 DVT OF LEG (DEEP VENOUS THROMBOSIS) (HCC): Status: ACTIVE | Noted: 2025-02-25

## 2025-02-28 ENCOUNTER — TELEPHONE (OUTPATIENT)
Dept: FAMILY MEDICINE CLINIC | Facility: CLINIC | Age: 45
End: 2025-02-28

## 2025-02-28 DIAGNOSIS — I82.462 ACUTE DEEP VEIN THROMBOSIS (DVT) OF CALF MUSCLE VEIN OF LEFT LOWER EXTREMITY (HCC): Primary | ICD-10-CM

## 2025-02-28 RX ORDER — HYDROCODONE BITARTRATE AND ACETAMINOPHEN 5; 325 MG/1; MG/1
1 TABLET ORAL EVERY 8 HOURS PRN
Qty: 45 TABLET | Refills: 0 | Status: SHIPPED | OUTPATIENT
Start: 2025-03-01

## 2025-02-28 NOTE — TELEPHONE ENCOUNTER
Spoke with pt: Was put on eliquis and states she is getting no relief.  Is taking norco 5/325mg q 6 hr as it says on the bottle which helps, but after it wears off, the pain comes back. Without pain meds rates pain as an 8/10. With norco reduces it to a 5/10 and taking it q 4 hr.  Got 12# from the ER on 2/26/25, taking 6#/d. Is almost out.    Is supposed to be able to put 20# on the surgical leg with her crutches, but is very hard to do with the calf pain from the DVT.    Rec-informed her that Eliquis is to help prevent any further clot from forming and that her body has to take time to break the clot down that is present.  Informed her it is a gradual process and the pain will be there for a while.  In the meantime she can elevate her leg and do anything to reduce the swelling.  Ice pack may be a good idea and could use IcyHot or Bengay over the leg for pain relief as well.  She is almost out of the Norco. I told her that 6 tablets a day is quite a lot but I will write for 3 tablets a day for the next 2 weeks and then likely reassess her after that.  She has an appointment to see me next week on 3/3/2025.

## 2025-02-28 NOTE — TELEPHONE ENCOUNTER
Patient states that she has a blood clot in her lower L leg. Patient states that she was given Eloquis by ER physician. Patient states that she is in pain. No swelling or hot to touch.   Patient would like to know if there is additional treatment as leg is not any better. Patient is on crutches for her hip surgery.  Please advise.

## 2025-03-03 ENCOUNTER — OFFICE VISIT (OUTPATIENT)
Dept: FAMILY MEDICINE CLINIC | Facility: CLINIC | Age: 45
End: 2025-03-03
Payer: COMMERCIAL

## 2025-03-03 VITALS
DIASTOLIC BLOOD PRESSURE: 70 MMHG | HEIGHT: 64 IN | OXYGEN SATURATION: 98 % | BODY MASS INDEX: 28.85 KG/M2 | WEIGHT: 169 LBS | SYSTOLIC BLOOD PRESSURE: 120 MMHG | HEART RATE: 92 BPM | TEMPERATURE: 98 F | RESPIRATION RATE: 16 BRPM

## 2025-03-03 DIAGNOSIS — S73.192S TEAR OF LEFT ACETABULAR LABRUM, SEQUELA: ICD-10-CM

## 2025-03-03 DIAGNOSIS — I82.492 ACUTE DEEP VEIN THROMBOSIS (DVT) OF OTHER SPECIFIED VEIN OF LEFT LOWER EXTREMITY (HCC): Primary | ICD-10-CM

## 2025-03-03 PROCEDURE — 3078F DIAST BP <80 MM HG: CPT | Performed by: FAMILY MEDICINE

## 2025-03-03 PROCEDURE — G2211 COMPLEX E/M VISIT ADD ON: HCPCS | Performed by: FAMILY MEDICINE

## 2025-03-03 PROCEDURE — 3008F BODY MASS INDEX DOCD: CPT | Performed by: FAMILY MEDICINE

## 2025-03-03 PROCEDURE — 3074F SYST BP LT 130 MM HG: CPT | Performed by: FAMILY MEDICINE

## 2025-03-03 PROCEDURE — 99214 OFFICE O/P EST MOD 30 MIN: CPT | Performed by: FAMILY MEDICINE

## 2025-03-03 NOTE — PROGRESS NOTES
[facility] ER Group Visit Note  3/3/2025      Subjective:      Patient ID: Juliana Ness is a 44 year old female.    Chief Complaint:  Chief Complaint   Patient presents with    ER F/U       HPI:  Juliana Ness is a 44 year old female who is being seen today for the above.      DVT L leg-   Is an avid running and hurt the hip during a hill work-out. Had a L hip arthroscopic surgery to repair the acetabulum, labral tear, etc on 2/5/25. Then on 2/25/25 after L calf pain and selling her physical therapist recommended she get evaluated. Had an US showing a DVT of the L calf.   Has norco for pain, but is sticking mostly to the tylenol for pain.   Dad had a h/o 2 clots in his legs.   Is doing PT.  Pain is improving a bit of the calf.    US L leg 2/25/25: Deep vein thrombus in the left gastrocnemius vein.       Review of Systems - as stated above in the HPI      Objective:     Vitals:    03/03/25 1411   BP: 120/70   Pulse: 92   Resp: 16   Temp: 97.9 °F (36.6 °C)   TempSrc: Temporal   SpO2: 98%   Weight: 169 lb (76.7 kg)   Height: 5' 4\" (1.626 m)       Physical Examination   General:  Alert, in no acute distress  HEENT: NCAT, EOMI, mucus membranes moist   Neck:  No cervical lymphadenopathy  CV: Regular rate and rhythm. No murmurs, gallops, or rubs.  Lungs:  Clear to auscultation B, no wheezes, rales, or rhonchi, normal respiratory effort  Abd:  +bowel sounds, soft  Ext:  trace L pedal edema,  Pedal pulses 2+ B Using crutches        Assessment:     1. Acute deep vein thrombosis (DVT) of other specified vein of left lower extremity (HCC)  - Hematology/Oncology Referral - [place]    2. Tear of left acetabular labrum, sequela    -discussed potential cheaper option of coumadin and can discuss with hematology soon  -r/o genetic reason.  -discussed no aggressive exercises at this time.      I am providing care as part of an ongoing, longitudinal care relationship.    Return for annual physical in April as planned.

## 2025-03-31 NOTE — PROGRESS NOTES
"Subjective     Patient ID: Nirmal Owens is a 37 y.o. male.    Chief Complaint: Follow-up    HPI    Established pt of Hermes Rankin MD     Here for follow up.     Concerned about A1c  Working with a online dietician   Drinking water only  Tried creatinine supplements/weight sonny  Freq urination, drinking gallon of water a day    Past Medical History:   Diagnosis Date    Allergy     Asthma     Eczema     Urticaria      Social History     Tobacco Use    Smoking status: Never    Smokeless tobacco: Never   Substance Use Topics    Alcohol use: Yes     Alcohol/week: 0.0 standard drinks of alcohol     Comment: occasionally    Drug use: No     Review of patient's allergies indicates:  No Known Allergies      Review of Systems   Constitutional:  Negative for chills, fever and unexpected weight change.   Respiratory:  Negative for cough, shortness of breath and wheezing.    Cardiovascular:  Negative for chest pain and leg swelling.   Gastrointestinal:  Negative for abdominal pain, nausea and vomiting.   Genitourinary:  Positive for frequency.   Integumentary:  Negative for rash.   Neurological:  Negative for weakness and headaches.   Psychiatric/Behavioral:  The patient is nervous/anxious (about health).           Objective  /64 (BP Location: Left arm, Patient Position: Sitting)   Pulse 81   Ht 6' 1" (1.854 m)   Wt 71.6 kg (157 lb 13.6 oz)   SpO2 95%   BMI 20.83 kg/m²       Physical Exam  Vitals reviewed.   Constitutional:       General: He is not in acute distress.     Appearance: He is well-developed. He is not ill-appearing.   HENT:      Head: Normocephalic and atraumatic.      Right Ear: Tympanic membrane, ear canal and external ear normal.      Left Ear: Tympanic membrane, ear canal and external ear normal.   Cardiovascular:      Rate and Rhythm: Normal rate and regular rhythm.      Heart sounds: No murmur heard.  Pulmonary:      Effort: Pulmonary effort is normal.      Breath sounds: Normal breath " Chief Complaint   Patient presents with    Physical       HPI:  Juliana Ness is a 44 year old female here today for preventative visit.    Imms- up to date with Td & flu. Discussed covid & pneum.      Cervical cancer screening- No h/o abnormal paps, HPV, or genital warts. Normal co-testing 7/26/21, repeat in 5 yrs      Breast cancer screening- No known family history of breast/ovarian cancer. Mammogram done 1/13/25 with subsequent imaging and L breast biopsy on 1/30/25 showed a fibroadenoma. Recommended to have a L breast US done in 6mo.      Colon cancer screening- No family h/o colon cancer.       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  Doing PT and using crutches.      Dental/Eye Check up-  Recommended pt see dentist once every 6 months for a cleaning and once every year for an eye exam.      Mod persistent asthma-  on Advair and montelukast, albuterol. Doing well with ACT 23/25.      DVT L leg-   pt was referred to hematology but forgot to go. Was debating on cheaper alternative but just forgot and used up the 1 mo worth of eliquis. Has been out now x 3 wks. Reiterated needing to be on it x 3 months and seeing heme.    History 3/3/25: Is an avid running and hurt the hip during a hill work-out. Had a L hip arthroscopic surgery to repair the acetabulum, labral tear, etc on 2/5/25. Then on 2/25/25 after L calf pain and selling her physical therapist recommended she get evaluated. Had an US showing a DVT of the L calf.   Has norco for pain, but is sticking mostly to the tylenol for pain.   Dad had a h/o 2 clots in his legs.   Is doing PT.  Pain is improving a bit of the calf.     US L leg 2/25/25: Deep vein thrombus in the left gastrocnemius vein.       Past Medical  History:    Abdominal pain    ADD (attention deficit disorder)    ADHD    Anxiety    Atypical lobular hyperplasia (ALH) of left breast    Back pain    Belching    Bipolar affective (HCC)    Bipolar disorder (HCC)    Bloating    Constipation    Depression    Diarrhea, unspecified    DVT of leg (deep venous thrombosis) (HCC)    L gastrocnemius, s/p ortho surgery    Dysmenorrhea    Fatigue    Feeling lonely    Fibroids    Frequent use of laxatives    Generalized anxiety disorder    History of depression    History of suicide attempt    x 3 (overdose on meds, 2020- CO poisoning)    IFG (impaired fasting glucose)    Irregular bowel habits    Left ovarian cyst    Leiomyoma of uterus, unspecified    Loss of appetite    Moderate persistent asthma (HCC)    Nausea    Non-seasonal allergic rhinitis due to pollen    OAB (overactive bladder)    OCD (obsessive compulsive disorder)    OCD--started on Effexor and referral to therapist--sees Una Barker---[phone]---taking prazosin for bad dreams    OCD (obsessive compulsive disorder)    OCD--started on medication and referral to therapist    Panic attacks    thought to have asthma--but it was panic attacks and SOB--PFT's see media-is WNL, off Asthma meds since early 2011    Problems with swallowing    PTSD (post-traumatic stress disorder)    Severe episode of recurrent major depressive disorder, without psychotic features (HCC)    Uncomfortable fullness after meals    Wears glasses     Past Surgical History:   Procedure Laterality Date    Breast biopsy Left 11/23/2022    pseudo-angiomatous stromal hyperplasia (PASH)    Breast biopsy Left 01/30/2025    fibroadenoma, benign    Cystoscopy,manipulatn  08/10/2018    Dr. Ross Silver gid & loclzj ndl/cath spi dx/ther njx  04/25/2014    Procedure: TRANSFORAMINAL EPIDURAL - LUMBAR;  Surgeon: Kaden Whatley MD;  Location: [facility]    Hand/finger surgery unlisted Right     Hip arthroscopy Left 02/05/2025     sounds. No wheezing or rales.   Abdominal:      General: Bowel sounds are normal.      Palpations: Abdomen is soft.      Tenderness: There is no abdominal tenderness.   Musculoskeletal:      Right lower leg: No edema.      Left lower leg: No edema.   Skin:     General: Skin is warm and dry.      Findings: No rash.   Neurological:      Mental Status: He is alert.   Psychiatric:         Mood and Affect: Mood normal.            Assessment and Plan     1. Prediabetes  Lab Results   Component Value Date    HGBA1C 5.7 (H) 06/13/2024   Lifestyle mod discussed  -     Hemoglobin A1C; Future; Expected date: 12/12/2024  -     BASIC METABOLIC PANEL; Future; Expected date: 12/12/2024  -     Ambulatory referral/consult to Nutrition Services; Future; Expected date: 12/19/2024  -     Urinalysis, Reflex to Urine Culture Urine, Clean Catch; Future; Expected date: 12/12/2024    2. Screening examination for STI  -     C. trachomatis/N. gonorrhoeae by AMP DNA; Future; Expected date: 12/12/2024  -     HIV 1/2 Ag/Ab (4th Gen); Future; Expected date: 12/12/2024  -     Treponema Pallidium Antibodies IgG, IgM; Future; Expected date: 12/12/2024  -     Hepatitis Panel, Acute; Future; Expected date: 12/12/2024    3. Vitamin D insufficiency  Continue current supplements  -     Vitamin D; Future; Expected date: 06/10/2025        Annabel Hernadez PA-C     acetabuloplasty, labral repair, femoral osteochondroplasty, & capsular plication, [facility]    Injection, w/wo contrast, dx/therapeutic substance, epidural/subarachnoid; lumbar/sacral  04/25/2014    Procedure: TRANSFORAMINAL EPIDURAL - LUMBAR;  Surgeon: Kaden Whatley MD;  Location: [facility]    M-sedaj by sm phys perfrmg svc 5+ yr  04/25/2014    Procedure: TRANSFORAMINAL EPIDURAL - LUMBAR;  Surgeon: Kaden Whatley MD;  Location: [facility]    Lola biopsy stereo nodule 1 site left (cpt=19081)      Shoulder arthroscopy Right 05/15/2023    SLAP repair    Us breast biopsy Left 08/31/2022    [place] teeth removed       Medications Ordered Prior to Encounter[1]  Allergies[2]  Social History     Socioeconomic History    Marital status:      Spouse name: Not on file    Number of children: Not on file    Years of education: Not on file    Highest education level: Not on file   Occupational History    Not on file   Tobacco Use    Smoking status: Never    Smokeless tobacco: Never   Vaping Use    Vaping status: Never Used   Substance and Sexual Activity    Alcohol use: Yes     Alcohol/week: 2.0 standard drinks of alcohol     Types: 2 Standard drinks or equivalent per week     Comment: 1-2x/wk white claw, never a problem    Drug use: Never    Sexual activity: Yes     Partners: Female   Other Topics Concern     Service Not Asked    Blood Transfusions Not Asked    Caffeine Concern Yes     Comment: coffee     Occupational Exposure Not Asked    Hobby Hazards Not Asked    Sleep Concern Not Asked    Stress Concern Not Asked    Weight Concern Not Asked    Special Diet Not Asked    Back Care Not Asked    Exercise Yes     Comment: 5-6 x week, weights, running    Bike Helmet Not Asked    Seat Belt Yes    Self-Exams Not Asked   Social History Narrative    EtOH--1 drink 1x/week or 1 drink every 2 weeks     Social Drivers of Health     Food Insecurity: No  Food Insecurity (4/1/2025)    NCSS - Food Insecurity     Worried About Running Out of Food in the Last Year: No     Ran Out of Food in the Last Year: No   Transportation Needs: No Transportation Needs (4/1/2025)    NCSS - Transportation     Lack of Transportation: No   Stress: Not on file   Housing Stability: Not At Risk (4/1/2025)    NCSS - Housing/Utilities     Has Housing: Yes     Worried About Losing Housing: No     Unable to Get Utilities: No     Family History   Problem Relation Age of Onset    Diabetes Mother     Hypertension Mother     Asthma Mother         COPD    Hypertension Father     PTSD Father     Skin cancer Father 60        Melanoma    Other (Other) Father     Cancer Father 60    Heart Attack Father         4 blocked vessels    Cancer Brother         testicular cancer    Bipolar Disorder Brother     Other (Other) Brother     Depression Brother     Pacemaker Brother        Review of Systems - All systems reviewed and negative except for HPI    PHYSICAL EXAM:  /72   Pulse 84   Temp 98.1 °F (36.7 °C) (Temporal)   Resp 16   Ht 5' 4\" (1.626 m)   Wt 175 lb (79.4 kg)   LMP 03/18/2025 (Approximate)   SpO2 98%   BMI 30.04 kg/m²   GENERAL APPEARANCE:  Alert, no acute distress, appears stated age  HEENT:  Head- Normocephalic, atraumatic.    Eyes- Extraocular movements intact, pupils equally round and reactive to light,  conjunctivae normal.    Ears- Tympanic membranes intact bilaterally.    Nose- Patent, normal septum and turbinates.    Mouth/Throat- Normal oral mucosa, throat non-erythematous.  NECK:  No submental, submandibular, ant/post cervical lymphadenopathy. No thyromegaly or masses.  PULMONARY:  Lungs clear to auscultation bilaterally. No wheezes, rales, or rhonchi. Normal respiratory effort.  CARDIOVASCULAR:  Regular rate and rhythm. No murmurs, gallops, or rubs.  ABDOMEN:  + bowel sounds, soft, nontender, nondistended. No hepatomegaly.  MUSCULOSKELETAL: Strength of upper and lower  extremities 5/5 bilaterally. Normal gait.  NEUROLOGIC:  Cranial nerves 2-12 grossly intact.  PSYCHIATRIC:  Normal mood, affect, and hygiene.   Breast: declines as had many exams prior to her bx    ASSESSMENT/PLAN:    1. Routine medical exam    2. Encounter for screening mammogram for malignant neoplasm of breast    3. Fibroadenoma of breast, left  - US BREAST LEFT LIMITED (CPT=76642); Future    4. Colon cancer screening  - Gastro Referral - In Network; Future    5. Laboratory exam ordered as part of routine general medical examination  - Lipid Panel; Future    6. IFG (impaired fasting glucose)  - Hemoglobin A1C; Future    7. Moderate persistent asthma without complication (HCC)  - albuterol 108 (90 Base) MCG/ACT Inhalation Aero Soln; Inhale 2 puffs into the lungs every 4 (four) hours as needed for Wheezing or Shortness of Breath (cough).  Dispense: 1 each; Refill: 11  - fluticasone-salmeterol (ADVAIR HFA) 230-21 MCG/ACT Inhalation Aerosol; Inhale 2 puffs into the lungs 2 (two) times daily.  Dispense: 12 g; Refill: 11    8. Acute deep vein thrombosis (DVT) of left peroneal vein (HCC)  - Hematology/Oncology Referral - [place]  - apixaban (ELIQUIS) 5 MG Oral Tab; Take 1 tablet (5 mg total) by mouth 2 (two) times daily.  Dispense: 120 tablet; Refill: 0        Patient verbalized understanding and agrees to plan.      Return in about 1 year (around 4/1/2026) for annual physical, we will contact you with results.         [1]   Current Outpatient Medications on File Prior to Visit   Medication Sig Dispense Refill    montelukast 10 MG Oral Tab Take 1 tablet (10 mg total) by mouth nightly. Authorized by: FELIPE MIN      gabapentin 800 MG Oral Tab TAKE ONE TABLET BY MOUTH THREE TIMES DAILY WITH A 100MG CAPSULE FOR A TOTAL 900MG THREE TIMES DAILY 90 tablet 2    gabapentin 100 MG Oral Cap TAKE 1 CAPSULE BY MOUTH EVERY MORNING AND AFTERNOON AND 4 CAPSULES(WITH 800MG CAPSULE FOR A TOTAL OF 1200MG) EVERY NIGHT AT  BEDTIME 180 capsule 0    ALPRAZolam 1 MG Oral Tab TAKE 1 TABLET BY MOUTH IN THE AFTERNOON AS NEEDED FOR ANXIETY 30 tablet 0    methylphenidate ER 54 MG Oral Tab CR Take 1 tablet (54 mg total) by mouth every morning. 30 tablet 0    ALPRAZolam ER 1 MG Oral Tablet 24 Hr Take 1 tablet (1 mg total) by mouth nightly. AT BEDTIME 30 tablet 1    ALPRAZolam ER 2 MG Oral Tablet 24 Hr TAKE 1 TABLET BY MOUTH EVERY MORNING 30 tablet 1    ARIPiprazole 20 MG Oral Tab Take 1 tablet (20 mg total) by mouth daily. 30 tablet 5    busPIRone HCl 30 MG Oral Tab Take 1 tablet (30 mg total) by mouth 3 (three) times daily. 270 tablet 0    FLUoxetine HCl 40 MG Oral Cap Take 1 capsule (40 mg total) by mouth daily. 90 capsule 1    lamoTRIgine 200 MG Oral Tab TAKE 1 TABLET BY MOUTH IN THE MORNING AND TAKE 1 TABLET BY MOUTH AT DINNER 180 tablet 1    albuterol (2.5 MG/3ML) 0.083% Inhalation Nebu Soln Take 3 mL (2.5 mg total) by nebulization every 4 (four) hours as needed for Wheezing or Shortness of Breath. 75 mL 3    SUMAtriptan (IMITREX) 25 MG Oral Tab Take 1 tablet (25 mg total) by mouth every 2 (two) hours as needed for Migraine. Use at onset; repeat once after 2 HRS-ONLY 2 IN 24 HR MAX 9 tablet 1     No current facility-administered medications on file prior to visit.   [2] No Known Allergies

## 2025-04-01 ENCOUNTER — OFFICE VISIT (OUTPATIENT)
Dept: FAMILY MEDICINE CLINIC | Facility: CLINIC | Age: 45
End: 2025-04-01
Payer: COMMERCIAL

## 2025-04-01 VITALS
SYSTOLIC BLOOD PRESSURE: 112 MMHG | HEART RATE: 84 BPM | TEMPERATURE: 98 F | WEIGHT: 175 LBS | RESPIRATION RATE: 16 BRPM | OXYGEN SATURATION: 98 % | DIASTOLIC BLOOD PRESSURE: 72 MMHG | BODY MASS INDEX: 29.88 KG/M2 | HEIGHT: 64 IN

## 2025-04-01 DIAGNOSIS — R73.01 IFG (IMPAIRED FASTING GLUCOSE): ICD-10-CM

## 2025-04-01 DIAGNOSIS — Z00.00 ROUTINE MEDICAL EXAM: Primary | ICD-10-CM

## 2025-04-01 DIAGNOSIS — Z12.31 ENCOUNTER FOR SCREENING MAMMOGRAM FOR MALIGNANT NEOPLASM OF BREAST: ICD-10-CM

## 2025-04-01 DIAGNOSIS — Z12.11 COLON CANCER SCREENING: ICD-10-CM

## 2025-04-01 DIAGNOSIS — I82.452 ACUTE DEEP VEIN THROMBOSIS (DVT) OF LEFT PERONEAL VEIN (HCC): ICD-10-CM

## 2025-04-01 DIAGNOSIS — Z00.00 LABORATORY EXAM ORDERED AS PART OF ROUTINE GENERAL MEDICAL EXAMINATION: ICD-10-CM

## 2025-04-01 DIAGNOSIS — J45.40 MODERATE PERSISTENT ASTHMA WITHOUT COMPLICATION (HCC): ICD-10-CM

## 2025-04-01 DIAGNOSIS — D24.2 FIBROADENOMA OF BREAST, LEFT: ICD-10-CM

## 2025-04-01 PROCEDURE — 99396 PREV VISIT EST AGE 40-64: CPT | Performed by: FAMILY MEDICINE

## 2025-04-01 PROCEDURE — 3078F DIAST BP <80 MM HG: CPT | Performed by: FAMILY MEDICINE

## 2025-04-01 PROCEDURE — 3008F BODY MASS INDEX DOCD: CPT | Performed by: FAMILY MEDICINE

## 2025-04-01 PROCEDURE — 3074F SYST BP LT 130 MM HG: CPT | Performed by: FAMILY MEDICINE

## 2025-04-01 RX ORDER — ALBUTEROL SULFATE 90 UG/1
2 INHALANT RESPIRATORY (INHALATION) EVERY 4 HOURS PRN
Qty: 1 EACH | Refills: 11 | Status: SHIPPED | OUTPATIENT
Start: 2025-04-01

## 2025-04-01 RX ORDER — FLUTICASONE PROPIONATE AND SALMETEROL XINAFOATE 230; 21 UG/1; UG/1
2 AEROSOL, METERED RESPIRATORY (INHALATION) 2 TIMES DAILY
Qty: 12 G | Refills: 11 | Status: SHIPPED | OUTPATIENT
Start: 2025-04-01

## 2025-04-01 RX ORDER — MONTELUKAST SODIUM 10 MG/1
10 TABLET ORAL NIGHTLY
COMMUNITY
Start: 2025-03-25

## 2025-04-02 ENCOUNTER — HOSPITAL ENCOUNTER (OUTPATIENT)
Dept: CT IMAGING | Age: 45
Discharge: HOME | End: 2025-04-02
Attending: FAMILY MEDICINE

## 2025-04-02 DIAGNOSIS — Z13.6 SCREENING FOR CARDIOVASCULAR CONDITION: ICD-10-CM

## 2025-04-04 ENCOUNTER — HOSPITAL ENCOUNTER (EMERGENCY)
Age: 45
Discharge: HOME | End: 2025-04-04
Attending: EMERGENCY MEDICINE
Payer: COMMERCIAL

## 2025-04-04 ENCOUNTER — APPOINTMENT (OUTPATIENT)
Dept: GENERAL RADIOLOGY | Age: 45
End: 2025-04-04
Attending: EMERGENCY MEDICINE
Payer: COMMERCIAL

## 2025-04-04 ENCOUNTER — APPOINTMENT (OUTPATIENT)
Dept: CT IMAGING | Age: 45
End: 2025-04-04
Attending: EMERGENCY MEDICINE
Payer: COMMERCIAL

## 2025-04-04 VITALS
BODY MASS INDEX: 29.88 KG/M2 | TEMPERATURE: 98 F | RESPIRATION RATE: 18 BRPM | HEIGHT: 64 IN | OXYGEN SATURATION: 99 % | DIASTOLIC BLOOD PRESSURE: 70 MMHG | WEIGHT: 175 LBS | HEART RATE: 74 BPM | SYSTOLIC BLOOD PRESSURE: 122 MMHG

## 2025-04-04 DIAGNOSIS — M25.552 PAIN IN JOINT INVOLVING LEFT PELVIC REGION AND THIGH: ICD-10-CM

## 2025-04-04 DIAGNOSIS — S09.8XXA BLUNT HEAD TRAUMA, INITIAL ENCOUNTER: Primary | ICD-10-CM

## 2025-04-04 LAB
ANION GAP SERPL CALC-SCNC: 6 MMOL/L (ref 0–18)
BASOPHILS # BLD AUTO: 0.03 X10(3) UL (ref 0–0.2)
BASOPHILS NFR BLD AUTO: 0.5 %
BUN BLD-MCNC: 10 MG/DL (ref 9–23)
CALCIUM BLD-MCNC: 9.3 MG/DL (ref 8.7–10.6)
CHLORIDE SERPL-SCNC: 109 MMOL/L (ref 98–112)
CO2 SERPL-SCNC: 26 MMOL/L (ref 21–32)
CREAT BLD-MCNC: 0.84 MG/DL
EGFRCR SERPLBLD CKD-EPI 2021: 87 ML/MIN/1.73M2 (ref 60–?)
EOSINOPHIL # BLD AUTO: 0.06 X10(3) UL (ref 0–0.7)
EOSINOPHIL NFR BLD AUTO: 1 %
ERYTHROCYTE [DISTWIDTH] IN BLOOD BY AUTOMATED COUNT: 13.6 %
GLUCOSE BLD-MCNC: 96 MG/DL (ref 70–99)
HCT VFR BLD AUTO: 37.8 %
HGB BLD-MCNC: 13.1 G/DL
IMM GRANULOCYTES # BLD AUTO: 0.04 X10(3) UL (ref 0–1)
IMM GRANULOCYTES NFR BLD: 0.6 %
LYMPHOCYTES # BLD AUTO: 1.36 X10(3) UL (ref 1–4)
LYMPHOCYTES NFR BLD AUTO: 21.9 %
MCH RBC QN AUTO: 31.1 PG (ref 26–34)
MCHC RBC AUTO-ENTMCNC: 34.7 G/DL (ref 31–37)
MCV RBC AUTO: 89.8 FL
MONOCYTES # BLD AUTO: 0.48 X10(3) UL (ref 0.1–1)
MONOCYTES NFR BLD AUTO: 7.7 %
NEUTROPHILS # BLD AUTO: 4.24 X10 (3) UL (ref 1.5–7.7)
NEUTROPHILS # BLD AUTO: 4.24 X10(3) UL (ref 1.5–7.7)
NEUTROPHILS NFR BLD AUTO: 68.3 %
OSMOLALITY SERPL CALC.SUM OF ELEC: 291 MOSM/KG (ref 275–295)
PLATELET # BLD AUTO: 220 10(3)UL (ref 150–450)
POTASSIUM SERPL-SCNC: 4.1 MMOL/L (ref 3.5–5.1)
RBC # BLD AUTO: 4.21 X10(6)UL
SODIUM SERPL-SCNC: 141 MMOL/L (ref 136–145)
WBC # BLD AUTO: 6.2 X10(3) UL (ref 4–11)

## 2025-04-04 PROCEDURE — 36415 COLL VENOUS BLD VENIPUNCTURE: CPT

## 2025-04-04 PROCEDURE — 73502 X-RAY EXAM HIP UNI 2-3 VIEWS: CPT | Performed by: EMERGENCY MEDICINE

## 2025-04-04 PROCEDURE — 99284 EMERGENCY DEPT VISIT MOD MDM: CPT

## 2025-04-04 PROCEDURE — 80048 BASIC METABOLIC PNL TOTAL CA: CPT | Performed by: EMERGENCY MEDICINE

## 2025-04-04 PROCEDURE — 99285 EMERGENCY DEPT VISIT HI MDM: CPT

## 2025-04-04 PROCEDURE — 70450 CT HEAD/BRAIN W/O DYE: CPT | Performed by: EMERGENCY MEDICINE

## 2025-04-04 PROCEDURE — 85025 COMPLETE CBC W/AUTO DIFF WBC: CPT | Performed by: EMERGENCY MEDICINE

## 2025-04-04 NOTE — ED INITIAL ASSESSMENT (HPI)
Pt fell down a flight of stairs on Saturday hit her head, recent hip surgery on R, on eliquis. Pt having pain in back and groin and pubic bone per pt.

## 2025-04-04 NOTE — DISCHARGE INSTRUCTIONS
Ibuprofen alternate with Tylenol for pain  Cool compresses topically for discomfort  Limited activity  Follow-up with orthopedic surgeon on Monday, wrote on further workup possible MRI to evaluate for internal derangement of your pelvis or hip joint.

## 2025-04-04 NOTE — ED PROVIDER NOTES
Patient Seen in: [facility] In [place]      History     Chief Complaint   Patient presents with    Fall     Stated Complaint: fell down flight of stairs, slid down on bottom and backside, having L hip / pu*    Subjective:   HPI      This is a 44-year-old female past medical history of OCD/panic/depression/dysmenorrhea/ADHD, DVT postoperatively on apixaban, recent left hip surgery who presents status post fall.  Patient states 6 days ago she slipped down her stairs.  She landed directly on her buttock fell backwards hit her head and slid down 14 stairs.  There was no LOC.  Since that time she is has difficulty walking and she has pain over her pubic bone.  She denies any neck pain.  No headaches.  No nausea or vomiting.  No back pain.  Pain is localized to the left pubic bone area.  Since is not improving she came in today for further evaluation.    Objective:     Past Medical History:    Abdominal pain    ADD (attention deficit disorder)    ADHD    Anxiety    Atypical lobular hyperplasia (ALH) of left breast    Back pain    Belching    Bipolar affective (HCC)    Bipolar disorder (HCC)    Bloating    Constipation    Depression    Diarrhea, unspecified    DVT of leg (deep venous thrombosis) (HCC)    L gastrocnemius, s/p ortho surgery    Dysmenorrhea    Fatigue    Feeling lonely    Fibroids    Frequent use of laxatives    Generalized anxiety disorder    History of depression    History of suicide attempt    x 3 (overdose on meds, 2020- [place] poisoning)    IFG (impaired fasting glucose)    Irregular bowel habits    Left ovarian cyst    Leiomyoma of uterus, unspecified    Loss of appetite    Moderate persistent asthma (HCC)    Nausea    Non-seasonal allergic rhinitis due to pollen    OAB (overactive bladder)    OCD (obsessive compulsive disorder)    OCD--started on Effexor and referral to therapist--sees Una Barker--MELANI [phone]---taking prazosin for bad dreams    OCD (obsessive compulsive  disorder)    OCD--started on medication and referral to therapist    Panic attacks    thought to have asthma--but it was panic attacks and SOB--PFT's see media-is WNL, off Asthma meds since early 2011    Problems with swallowing    PTSD (post-traumatic stress disorder)    Severe episode of recurrent major depressive disorder, without psychotic features (HCC)    Uncomfortable fullness after meals    Wears glasses              Past Surgical History:   Procedure Laterality Date    Breast biopsy Left 11/23/2022    pseudo-angiomatous stromal hyperplasia (PASH)    Breast biopsy Left 01/30/2025    fibroadenoma, benign    Cystoscopy,manipulatn  08/10/2018    Dr. Ross Silver gid & loclzj ndl/cath spi dx/ther njx  04/25/2014    Procedure: TRANSFORAMINAL EPIDURAL - LUMBAR;  Surgeon: Kaden Whatley MD;  Location: [facility]    Hand/finger surgery unlisted Right     Hip arthroscopy Left 02/05/2025    acetabuloplasty, labral repair, femoral osteochondroplasty, & capsular plication, Dr. Jovi Hernandez, [facility]    Injection, w/wo contrast, dx/therapeutic substance, epidural/subarachnoid; lumbar/sacral  04/25/2014    Procedure: TRANSFORAMINAL EPIDURAL - LUMBAR;  Surgeon: Kaden Whatley MD;  Location: [facility]    M-seda by  phys perfrmg sv 5+ yr  04/25/2014    Procedure: TRANSFORAMINAL EPIDURAL - LUMBAR;  Surgeon: Kaden Whatley MD;  Location: [facility]    Lola biopsy stereo nodule 1 site left (cpt=19081)      Shoulder arthroscopy Right 05/15/2023    SLAP repair    Us breast biopsy Left 08/31/2022    Holiday teeth removed                  Social History     Socioeconomic History    Marital status:    Tobacco Use    Smoking status: Never    Smokeless tobacco: Never   Vaping Use    Vaping status: Never Used   Substance and Sexual Activity    Alcohol use: Yes     Alcohol/week: 2.0 standard drinks of alcohol     Types: 2 Standard drinks or equivalent  per week     Comment: 1-2x/wk white claw, never a problem    Drug use: Never    Sexual activity: Yes     Partners: Female   Other Topics Concern    Caffeine Concern Yes     Comment: coffee     Exercise Yes     Comment: 5-6 x week, weights, running    Seat Belt Yes   Social History Narrative    EtOH--1 drink 1x/week or 1 drink every 2 weeks     Social Drivers of Health     Food Insecurity: No Food Insecurity (4/1/2025)    NCSS - Food Insecurity     Worried About Running Out of Food in the Last Year: No     Ran Out of Food in the Last Year: No   Transportation Needs: No Transportation Needs (4/1/2025)    NCSS - Transportation     Lack of Transportation: No   Housing Stability: Not At Risk (4/1/2025)    NCSS - Housing/Utilities     Has Housing: Yes     Worried About Losing Housing: No     Unable to Get Utilities: No                  Physical Exam     ED Triage Vitals [04/04/25 0946]   BP (!) 132/97   Pulse 88   Resp 18   Temp 98.2 °F (36.8 °C)   Temp src Oral   SpO2 100 %   O2 Device None (Room air)       Current Vitals:   Vital Signs  BP: 122/70  Pulse: 74  Resp: 18  Temp: 98.2 °F (36.8 °C)  Temp src: Oral    Oxygen Therapy  SpO2: 99 %  O2 Device: None (Room air)        Physical Exam  GENERAL: Awake, alert oriented x3, nontoxic appearing.   SKIN: Normal, warm, and dry.  HEENT: Atraumatic.  No palpable hematoma.  Pupils equally round and reactive to light. Conjuctiva clear.  Oropharynx is clear and moist.  No cervical spine tenderness.  Lungs: Clear to auscultation bilaterally with no rales, no retractions, and no wheezing.  HEART:  Regular rate and rhythm. S1 and S2. No murmurs, no rubs or gallops.   ABDOMEN: Soft, nontender and nondistended. Normoactive bowel sounds. No rebound. No guarding.   Back: No tenderness along thoracic or cervical spine.  Pelvis: Pain with palpation over the superior pubic rami.  Pain with flexion of the hip.  No gross bruising   Or deformity noted.  Patient is able to ambulate but  limps.  EXTREMITIES: No gross deformity or upper extremity complaints.  No lower extremity complaints.  Warm with brisk capillary refill.         ED Course     Labs Reviewed   BASIC METABOLIC PANEL (8) - Normal   CBC WITH DIFFERENTIAL WITH PLATELET   RAINBOW DRAW BLUE          XR HIP W OR WO PELVIS 2 OR 3 VIEWS, LEFT (CPT=73502)    Result Date: 4/4/2025  PROCEDURE:  XR HIP W OR WO PELVIS 2 OR 3 VIEWS, LEFT (CPT=73502)  TECHNIQUE:  Unilateral 2 to 3 views of the hip and pelvis if performed.  COMPARISON:  None.  INDICATIONS:  fell down flight of stairs, slid down on bottom and backside, having L hip / pubic pain  PATIENT STATED HISTORY: (As transcribed by Technologist)  Patient complains of sharp mid pelvic pain radiating laterally due to falling down the stairs March 28, 2025. Patient has full range of motion and a history of a torn labrum on the left side. Patient had a lubrum repair surgery two months ago.               CONCLUSION:  Negative for fracture or malalignment of the pelvis or hips.  Normal femoral head and acetabular morphology with preservation of hip joint spaces bilaterally.  Obturator rings are intact.  Normal sacroiliac joints and pubic symphysis.    LOCATION:  [facility]   Dictated by (CST): Latrell Engle MD on 4/04/2025 at 11:02 AM     Finalized by (CST): Latrell Engle MD on 4/04/2025 at 11:03 AM       CT BRAIN OR HEAD (CPT=70450)    Result Date: 4/4/2025  PROCEDURE:  CT BRAIN OR HEAD (83556)  COMPARISON:  [facility], CT, CTA BRAIN + CTA CAROTIDS (CPT=70496/80557), 12/08/2022, 6:08 PM.  INDICATIONS:  fell down flight of stairs, slid down on bottom and backside, having L hip / pubic pain  TECHNIQUE:  Noncontrast CT scanning is performed through the brain. Dose reduction techniques were used. Dose information is transmitted to the ACR (American College of Radiology) NRDR (National Radiology Data Registry) which includes the Dose Index Registry.  PATIENT STATED HISTORY: (As transcribed by Technologist)    fell down flight of stairs, slid down on bottom and backside, having L hip / pubic pain.   FINDINGS:  VENTRICLES/SULCI:  Ventricles and sulci are normal in size.  INTRACRANIAL:  No intracranial hemorrhage, mass effect, or acute large vessel transcortical infarct.  Gray-white differentiation is preserved. SINUSES:           Paranasal sinuses and mastoid air cells are clear. SKULL:             No evidence for fracture or osseous abnormality. OTHER:             None.            CONCLUSION:  Negative for acute intracranial process.    LOCATION:  [facility]   Dictated by (CST): Latrell Engle MD on 4/04/2025 at 10:57 AM     Finalized by (CST): Latrell Engle MD on 4/04/2025 at 10:58 AM     3/2025 at 7:48 AM     Finalized by (CST): Supriya Duque MD on 4/03/2025 at 7:48 AM              MDM      This is a 44-year-old female past medical history of OCD/panic/depression/dysmenorrhea/ADHD, DVT postoperatively on apixaban, recent left hip surgery who presents status post fall.  Patient states 6 days ago she slipped down her stairs.  She landed directly on her buttock fell backwards hit her head and slid down 14 stairs.  There was no LOC.  Since that time she is has difficulty walking and she has pain over her pubic bone.  Differential includes acute intracranial injury which is a life threat, pelvic fracture.        Basic labs were checked.  CBC: White blood cell count 6.2.  Hemoglobin 13.1.  Platelet 220.  BMP: BUN 10.  Creatinine 0.8.  Glucose 96.  Bicarb 26        CT scan brain was obtained and demonstrated: No acute intracranial pathology    I independently reviewed the left hip/pelvic x-ray which demonstrated no evidence of obvious fracture.  No hip dislocation.  I also reviewed the radiology interpretation agreement.    Patient may have muscle strain.  Still cannot rule out occult fracture.  Recommended follow-up with orthopedics.  May need MRI if discomfort does not improve.    Patient aware of plan expresses understanding.   Continue ibuprofen or Tylenol for pain.  Cold compresses.  We discussed that she should follow-up with her orthopedic surgeon.  She may need MRI of the hip as there probably is internal derangement.  Patient was discharged home in good condition.      Disposition and Plan     Clinical Impression:  1. Blunt head trauma, initial encounter    2. Pain in joint involving left pelvic region and thigh         Disposition:  Discharge  4/4/2025 11:53 am    Follow-up:  your orthopedic surgeon    Follow up on 4/7/2025            Medications Prescribed:  Current Discharge Medication List              Supplementary Documentation:

## 2025-04-16 ENCOUNTER — APPOINTMENT (OUTPATIENT)
Dept: ULTRASOUND IMAGING | Age: 45
End: 2025-04-16
Attending: EMERGENCY MEDICINE
Payer: COMMERCIAL

## 2025-04-16 ENCOUNTER — HOSPITAL ENCOUNTER (EMERGENCY)
Age: 45
Discharge: HOME | End: 2025-04-16
Attending: EMERGENCY MEDICINE
Payer: COMMERCIAL

## 2025-04-16 VITALS
TEMPERATURE: 98 F | BODY MASS INDEX: 30.05 KG/M2 | RESPIRATION RATE: 14 BRPM | SYSTOLIC BLOOD PRESSURE: 117 MMHG | WEIGHT: 176 LBS | HEART RATE: 78 BPM | OXYGEN SATURATION: 100 % | HEIGHT: 64 IN | DIASTOLIC BLOOD PRESSURE: 46 MMHG

## 2025-04-16 DIAGNOSIS — M79.89 LEG SWELLING: Primary | ICD-10-CM

## 2025-04-16 PROCEDURE — 93971 EXTREMITY STUDY: CPT | Performed by: EMERGENCY MEDICINE

## 2025-04-16 PROCEDURE — 99284 EMERGENCY DEPT VISIT MOD MDM: CPT

## 2025-04-16 NOTE — ED PROVIDER NOTES
Patient Seen in: [facility] [place]      History     Chief Complaint   Patient presents with    Swelling Edema     Stated Complaint: left lower leg pain    Subjective:   HPI    44-year-old female presents reporting pain behind the left knee that began 1 day ago.  She was diagnosed with a DVT in the left gastrocnemius vein about 7 weeks ago.  She states she took the Eliquis for about a month and then stopped it for couple of weeks.  She just started back on the anticoagulation 4 or 5 days ago.  She denies chest pain, palpitations, shortness of breath.  She was with her physical therapist today and reported the symptoms behind the knee and they encouraged her to come to the emergency room to get checked out.  She reports she is receiving physical therapy for a torn labrum in her left hip so they are doing exercises with the left leg but she says there has not been anything new  History of Present Illness               Objective:     Past Medical History:    Abdominal pain    ADD (attention deficit disorder)    ADHD    Anxiety    Atypical lobular hyperplasia (ALH) of left breast    Back pain    Belching    Bipolar affective (HCC)    Bipolar disorder (HCC)    Bloating    Constipation    Depression    Diarrhea, unspecified    DVT of leg (deep venous thrombosis) ([facility])    L gastrocnemius, s/p ortho surgery    Dysmenorrhea    Fatigue    Feeling lonely    Fibroids    Frequent use of laxatives    Generalized anxiety disorder    History of depression    History of suicide attempt    x 3 (overdose on meds, 2020- [place] poisoning)    IFG (impaired fasting glucose)    Irregular bowel habits    Left ovarian cyst    Leiomyoma of uterus, unspecified    Loss of appetite    Moderate persistent asthma (HCC)    Nausea    Non-seasonal allergic rhinitis due to pollen    OAB (overactive bladder)    OCD (obsessive compulsive disorder)    OCD--started on Effexor and referral to therapist--sees Una Barker--MELANI  [phone]---taking prazosin for bad dreams    OCD (obsessive compulsive disorder)    OCD--started on medication and referral to therapist    Panic attacks    thought to have asthma--but it was panic attacks and SOB--PFT's see media-is WNL, off Asthma meds since early 2011    Problems with swallowing    PTSD (post-traumatic stress disorder)    Severe episode of recurrent major depressive disorder, without psychotic features (HCC)    Uncomfortable fullness after meals    Wears glasses              Past Surgical History:   Procedure Laterality Date    Breast biopsy Left 11/23/2022    pseudo-angiomatous stromal hyperplasia (PASH)    Breast biopsy Left 01/30/2025    fibroadenoma, benign    Cystoscopy,manipulatn  08/10/2018    Dr. Ross Silver gid & loclzj ndl/cath spi dx/ther njx  04/25/2014    Procedure: TRANSFORAMINAL EPIDURAL - LUMBAR;  Surgeon: Kaden Whatley MD;  Location: [facility]    Hand/finger surgery unlisted Right     Hip arthroscopy Left 02/05/2025    acetabuloplasty, labral repair, femoral osteochondroplasty, & capsular plication, Dr. Jovi Hernandez, [facility]    Injection, w/wo contrast, dx/therapeutic substance, epidural/subarachnoid; lumbar/sacral  04/25/2014    Procedure: TRANSFORAMINAL EPIDURAL - LUMBAR;  Surgeon: Kaden Whatley MD;  Location: [facility]    sed by  phys perfrm svc 5+ yr  04/25/2014    Procedure: TRANSFORAMINAL EPIDURAL - LUMBAR;  Surgeon: Kaden Whatley MD;  Location: [facility]    Lola biopsy stereo nodule 1 site left (cpt=19081)      Shoulder arthroscopy Right 05/15/2023    SLAP repair    Us breast biopsy Left 08/31/2022    [place] teeth removed                  Social History     Socioeconomic History    Marital status:    Tobacco Use    Smoking status: Never    Smokeless tobacco: Never   Vaping Use    Vaping status: Never Used   Substance and Sexual Activity    Alcohol use: Yes     Alcohol/week:  2.0 standard drinks of alcohol     Types: 2 Standard drinks or equivalent per week     Comment: 1-2x/wk white claw, never a problem    Drug use: Never    Sexual activity: Yes     Partners: Female   Other Topics Concern    Caffeine Concern Yes     Comment: coffee     Exercise Yes     Comment: 5-6 x week, weights, running    Seat Belt Yes   Social History Narrative    EtOH--1 drink 1x/week or 1 drink every 2 weeks     Social Drivers of Health     Food Insecurity: No Food Insecurity (4/1/2025)    NCSS - Food Insecurity     Worried About Running Out of Food in the Last Year: No     Ran Out of Food in the Last Year: No   Transportation Needs: No Transportation Needs (4/1/2025)    NCSS - Transportation     Lack of Transportation: No   Housing Stability: Not At Risk (4/1/2025)    NCSS - Housing/Utilities     Has Housing: Yes     Worried About Losing Housing: No     Unable to Get Utilities: No                                Physical Exam     ED Triage Vitals [04/16/25 1830]   /63   Pulse 95   Resp 14   Temp 98.4 °F (36.9 °C)   Temp src Oral   SpO2 99 %   O2 Device None (Room air)       Current Vitals:   Vital Signs  BP: 129/63  Pulse: 95  Resp: 14  Temp: 98.4 °F (36.9 °C)  Temp src: Oral    Oxygen Therapy  SpO2: 99 %  O2 Device: None (Room air)        Physical Exam  General:  Vitals as listed.  No acute distress   Lungs: good air exchange and clear   Heart: regular rate rhythm and no murmur   Abdomen: Soft and nontender.  No abdominal masses.  No peritoneal signs   Extremities: There is some tenderness on palpation into the popliteal fossa of the left lower extremity.  No discoloration to the leg.  No palpable soft tissue masses.  No edema, normal peripheral pulses   Neuro: Awake and alert.  Moving all extremities.  Skin: no rashes or nodules  Physical Exam                ED Course   Labs Reviewed - No data to display       Results            US VENOUS DOPPLER LEG LEFT - DIAG IMG (CPT=93971)  Result Date:  2025  PROCEDURE:  US VENOUS DOPPLER LEG LEFT - DIAG IMG (CPT=93971)  COMPARISON:  None.  INDICATIONS:  eval for DVT.  Pain left leg  TECHNIQUE:  Real time, grey scale, and duplex ultrasound was used to evaluate the lower extremity venous system. B-mode two-dimensional images of the vascular structures, Doppler spectral analysis, and color flow.  Doppler imaging were performed.  The following veins were imaged:  Common, deep, and superficial femoral, popliteal, sapheno-femoral junction, posterior tibial veins, and the contralateral common femoral vein.  PATIENT STATED HISTORY: (As transcribed by Technologist)  Patient stated pain behind left knee since last night.    FINDINGS:  EXTREMITY EXAMINED:  Left leg SAPHENOFEMORAL JUNCTION:  No reflux. THROMBI:  None visible. COMPRESSION:  Normal compressibility, phasicity, and augmentation.            CONCLUSION:  There is no DVT in the left leg.   LOCATION:  [facility]    Dictated by (CST): Catalina Salamanca MD on 2025 at 8:09 PM     Finalized by (CST): Catalina Salamanca MD on 2025 at 8:10 PM       CT CALCIUM SCORING  Result Date: 2025            PROCEDURE:  CT CALCIUM SCORING  COMPARISON:  [facility], CT, CT CALCIUM SCORING OVER READ, 2025, 9:41 PM.  INDICATIONS:  Z13.6 Screening for cardiovascular condition  TECHNIQUE:  The patient was placed in the supine position on the multidetector CT table and high-resolution non-contrast limited CT images of the heart, coronary arteries and proximal great vessels were obtained. Dose reduction techniques were used. Dose  information is transmitted to the ACR (American College of Radiology) NRDR (National Radiology Data Registry) which includes the Dose Index Registry. This cardiac scan was interpreted by a cardiologist with respect to the heart only. Please consult the radiology report for further interpretation  FINDINGS:   REGION  CALCIUM SCORE  LEFT MAIN:  0 LEFT ANTERIOR DESCENDIN CIRCUMFLEX:  0 RIGHT CORONARY  ARTERY:    0  TOTAL CALCIUM SCORE:  0  Calcium Score  Implication   0  No identifiable calcified plaque   1-100  Mild atherosclerotic plaque   101-300  Moderate atherosclerotic plaque   301-999  Moderate-severe atherosclerotic plaque   >1000  Severe atherosclerotic plaque  1. J Am Katherin Cardiol Img. 2022 Nov, 15 [phone]  Clinical Relevance of Coronary Artery Scan  This patient identified you as their physician, if this is not correct please contact the Nurse [facility] at [phone] and they will assign this report to another physician.    Dictated by (CST): Basil Engle MD on 4/08/2025 at 7:23 PM     Finalized by (CST): Basil Engle MD on 4/08/2025 at 7:23 PM       XR HIP W OR WO PELVIS 2 OR 3 VIEWS, LEFT (CPT=73502)  Result Date: 4/4/2025  PROCEDURE:  XR HIP W OR WO PELVIS 2 OR 3 VIEWS, LEFT (CPT=73502)  TECHNIQUE:  Unilateral 2 to 3 views of the hip and pelvis if performed.  COMPARISON:  None.  INDICATIONS:  fell down flight of stairs, slid down on bottom and backside, having L hip / pubic pain  PATIENT STATED HISTORY: (As transcribed by Technologist)  Patient complains of sharp mid pelvic pain radiating laterally due to falling down the stairs March 28, 2025. Patient has full range of motion and a history of a torn labrum on the left side. Patient had a lubrum repair surgery two months ago.               CONCLUSION:  Negative for fracture or malalignment of the pelvis or hips.  Normal femoral head and acetabular morphology with preservation of hip joint spaces bilaterally.  Obturator rings are intact.  Normal sacroiliac joints and pubic symphysis.    LOCATION:  [facility]   Dictated by (CST): Latrell Engle MD on 4/04/2025 at 11:02 AM     Finalized by (CST): Latrell Engle MD on 4/04/2025 at 11:03 AM       CT BRAIN OR HEAD (CPT=70450)  Result Date: 4/4/2025  PROCEDURE:  CT BRAIN OR HEAD (09760)  COMPARISON:  [facility], CT, CTA BRAIN + CTA CAROTIDS (CPT=70496/53910), 12/08/2022, 6:08 PM.  INDICATIONS:  fell down  flight of stairs, slid down on bottom and backside, having L hip / pubic pain  TECHNIQUE:  Noncontrast CT scanning is performed through the brain. Dose reduction techniques were used. Dose information is transmitted to the ACR (American College of Radiology) NRDR (National Radiology Data Registry) which includes the Dose Index Registry.  PATIENT STATED HISTORY: (As transcribed by Technologist)   fell down flight of stairs, slid down on bottom and backside, having L hip / pubic pain.   FINDINGS:  VENTRICLES/SULCI:  Ventricles and sulci are normal in size.  INTRACRANIAL:  No intracranial hemorrhage, mass effect, or acute large vessel transcortical infarct.  Gray-white differentiation is preserved. SINUSES:           Paranasal sinuses and mastoid air cells are clear. SKULL:             No evidence for fracture or osseous abnormality. OTHER:             None.            CONCLUSION:  Negative for acute intracranial process.    LOCATION:  [facility]   Dictated by (CST): Latrell Engle MD on 4/04/2025 at 10:57 AM     Finalized by (CST): Latrell Engle MD on 4/04/2025 at 10:58 AM       CT CALCIUM SCORING OVER READ  Result Date: 4/3/2025  This is an over read for the non-cardiac, non-vascular limited visualized portions of the chest and abdomen.  PROCEDURE:  CT CALCIUM SCORING OVER READ  COMPARISON:  None.  INDICATIONS:  Z13.6 Screening for cardiovascular condition  TECHNIQUE:  Unenhanced multislice CT scanning is performed through the chest as part of a cardiac CT evaluation.  Dose reduction techniques were used. Dose information is transmitted to the ACR (American College of Radiology) NRDR (National Radiology Data Registry) which includes the Dose Index Registry.  PATIENT STATED HISTORY: (As transcribed by Technologist)  screening    FINDINGS:  LUNGS:  No visible pulmonary disease.  DA:  No mass or adenopathy.  MEDIASTINUM:  No mass or adenopathy.  PLEURA:  No mass or effusion.  CHEST WALL:  No mass or axillary adenopathy.   LIMITED ABDOMEN:  Limited images of the upper abdomen are unremarkable.  BONES:  No bony lesion or fracture.    This is an over read for the non-cardiac, non-vascular limited visualized portions of the chest and abdomen. This exam was not performed as a complete diagnostic CT of the chest. Please see the cardiologists' dictation which is reported separately.            CONCLUSION:  No acute pulmonary findings    LOCATION:  [facility]   Dictated by (CST): Supriya Duque MD on 4/03/2025 at 7:48 AM     Finalized by (CST): Supriya Duque MD on 4/03/2025 at 7:48 AM                            MDM      44-year-old female presents reporting pain behind the left knee.  Currently on Eliquis for a DVT in the left gastrocnemius vein that was diagnosed 7 weeks ago.  She stopped taking her blood thinners for a couple of weeks and just restarted for 5 days ago.  Denies any cardiopulmonary symptoms    Additional history obtained by PCP clinic documentation from the time of her original clot reports that the patient's dad has a history of of DVTs and the patient was referred to hematology.  The patient tells me she did not set that follow-up but will call tomorrow.    Differential includes but is not limited to Baker's cyst, sprain/strain, DVT, a life/function threat.    Ultrasound of the left lower extremity ordered for further evaluation.    My independent interpretation of ultrasound of the left lower extremity is that there is no obvious DVT.  I did also review radiology documentation which reports there is no DVT in the leg.  Unclear etiology of her pain.  Possibly related to her physical therapy.  Thankfully at this time there is no DVT related to her recent noncompliance with anticoagulation.  Okay for discharge home and should follow-up with primary care doctor and hematology as planned.            Medical Decision Making      Disposition and Plan     Clinical Impression:  1. Leg swelling          Disposition:  Discharge  4/16/2025  8:11 pm    Follow-up:  Deanna Padron MD  [address] [place] [id number]  [phone]    Follow up      Yana Ureña MD  [address] [place] [id number]  [phone]    Follow up  hematology          Medications Prescribed:  Current Discharge Medication List          Supplementary Documentation:

## 2025-04-19 DIAGNOSIS — J45.40 MODERATE PERSISTENT ASTHMA WITHOUT COMPLICATION (HCC): ICD-10-CM

## 2025-04-19 RX ORDER — FLUTICASONE PROPIONATE AND SALMETEROL XINAFOATE 230; 21 UG/1; UG/1
2 AEROSOL, METERED RESPIRATORY (INHALATION) 2 TIMES DAILY
Qty: 12 G | Refills: 11 | Status: SHIPPED | OUTPATIENT
Start: 2025-04-19

## 2025-04-19 NOTE — TELEPHONE ENCOUNTER
Name from pharmacy: [facility] /21MCG ORAL INH 120S         Will file in chart as: ADVAIR -21 MCG/ACT Inhalation Aerosol    Sig: INHALE 2 PUFFS INTO THE LUNGS TWICE DAILY    Disp: 12 g    Refills: 11 (Pharmacy requested: Not specified)    [facility]    Start: 4/19/2025    Class: Normal    Non-formulary For: Moderate persistent asthma without complication (HCC)    Last ordered: 2 weeks ago (4/1/2025) by Deanna Padron MD    Last refill: 4/1/2025    Rx #: [id number]    Asthma & COPD Medication Protocol [id number]/19/2025 10:53 AM   Protocol Details ACT Score greater than or equal to 20    Appointment in past 6 or next 3 months    ACT recorded in the last 12 months    Medication is active on med list      To be filled at: [id number] [address] [facility] AT [facility] [address]), [phone], [phone]

## 2025-05-01 ENCOUNTER — APPOINTMENT (OUTPATIENT)
Age: 45
End: 2025-05-01
Attending: INTERNAL MEDICINE
Payer: COMMERCIAL

## 2025-05-28 DIAGNOSIS — J45.40 MODERATE PERSISTENT ASTHMA WITHOUT COMPLICATION (HCC): ICD-10-CM

## 2025-05-29 RX ORDER — FLUTICASONE PROPIONATE AND SALMETEROL XINAFOATE 230; 21 UG/1; UG/1
2 AEROSOL, METERED RESPIRATORY (INHALATION) 2 TIMES DAILY
Qty: 12 G | Refills: 11 | OUTPATIENT
Start: 2025-05-29

## 2025-05-29 NOTE — TELEPHONE ENCOUNTER
: [facility] /21MCG ORAL INH 120S          Will file in chart as: ADVAIR -21 MCG/ACT Inhalation Aerosol    Sig: INHALE 2 PUFFS INTO THE LUNGS TWICE DAILY    Disp: 12 g    Refills: 11 (Pharmacy requested: Not specified)    [facility]AS    Start: 5/28/2025    Class: Normal    Non-formulary For: Moderate persistent asthma without complication (HCC)    Last ordered: 1 month ago (4/19/2025) by Deanna Padron MD    Last refill: 4/19/2025    Rx #: [id number]    Asthma & COPD Medication Protocol Unccwc2305/28/2025 05:40 PM   Protocol Details ACT Score greater than or equal to 20    Appointment in past 6 or next 3 months    ACT recorded in the last 12 months    Medication is active on med list      To be filled at: [id number] [place] [id number] [facility] AT [facility] [address]), [phone], [phone]        LOV: 4/1/25 for physical  RTC: 1 year  Last ACT Score: 4/1/25  Filled: 4/19/25 #12 g  with 11 refills    Future Appointments   Date Time Provider Department Center   6/16/2025  9:45 AM Domitila Quick APRN [facility]

## 2025-07-11 ENCOUNTER — TELEPHONE (OUTPATIENT)
Dept: FAMILY MEDICINE CLINIC | Facility: CLINIC | Age: 45
End: 2025-07-11

## 2025-07-11 DIAGNOSIS — D24.2 FIBROADENOMA OF BREAST, LEFT: Primary | ICD-10-CM

## 2025-07-11 NOTE — TELEPHONE ENCOUNTER
Patient requesting order for 6 month follow up mammogram.    Per biopsy notes 1/30/25, patient to complete ultrasound in 6 months.    Order pended of US unless you want to order a diagnostic mammogram

## 2025-07-14 RX ORDER — MONTELUKAST SODIUM 10 MG/1
10 TABLET ORAL NIGHTLY
Qty: 90 TABLET | Refills: 0 | OUTPATIENT
Start: 2025-07-14

## 2025-07-14 NOTE — TELEPHONE ENCOUNTER
Received request for: Montelukast 10 mg    Patient last seen by: Dr. Borjas on 4-1-25.      Has scheduled appointment: none.     Physician recommendation:  Continue Singulair and follow up in 6 months.        Refill for Montelukast refused.  Appointment needed.  [facility] message sent to pt.

## 2025-07-29 PROBLEM — Z12.11 SPECIAL SCREENING FOR MALIGNANT NEOPLASMS, COLON: Status: ACTIVE | Noted: 2025-07-29

## 2025-07-29 PROBLEM — D12.2 BENIGN NEOPLASM OF ASCENDING COLON: Status: ACTIVE | Noted: 2025-07-29

## 2025-08-08 ENCOUNTER — PATIENT MESSAGE (OUTPATIENT)
Dept: FAMILY MEDICINE CLINIC | Facility: CLINIC | Age: 45
End: 2025-08-08

## (undated) NOTE — LETTER
Date: 4/19/2018    Patient Name: Meliza Valdez          To Whom it may concern: This letter has been written at the patient's request. The above patient was seen at the [facility] for treatment of a medical condition.     This patient s

## (undated) NOTE — LETTER
Date & Time: 11/4/2018, 4:48 PM  Patient: Missy Silver  Encounter Provider(s):    Lance Campos MD       To Whom It May Concern:    Lizzette Slaughter was seen and treated in our department on 11/4/2018.  She should not return to work until Tuesday

## (undated) NOTE — MR AVS SNAPSHOT
After Visit Summary   2023    Jorge Luis Ashby   MRN: [id number]           Visit Information     Date & Time  2023  9:00 AM Provider  [address] [place] [address] Dept. Phone  [phone]      Your Vitals Were     LMP   2022 (Approximate)             Allergies as of 2023  Review status set to Review Complete on 2022   No Known Allergies     Your Current Medications        Dosage    methylphenidate ER (CONCERTA) 54 MG Oral Tab CR Take 1 tablet (54 mg total) by mouth every morning. nirmatrelvir&ritonavir 300/100 20 x 150 MG & 10 x 100MG Oral Tablet Therapy Pack () Take two nirmatrelvir tablets (300mg) with one ritonavir tablet (100mg) together twice daily for 5 days. benzonatate 200 MG Oral Cap Take 1 capsule (200 mg total) by mouth 3 (three) times daily as needed for cough. fluticasone propionate 50 MCG/ACT Nasal Suspension 2 sprays by Each Nare route daily. fluticasone-salmeterol (ADVAIR DISKUS) 100-50 MCG/ACT Inhalation Aerosol Powder, Breath Activated Inhale 1 puff into the lungs 2 (two) times daily. albuterol 108 (90 Base) MCG/ACT Inhalation Aero Soln Inhale 2 puffs into the lungs every 4 (four) hours as needed for Wheezing. Budesonide-Formoterol Fumarate (SYMBICORT) 160-4.5 MCG/ACT Inhalation Aerosol Inhale 2 puffs into the lungs 2 (two) times daily. promethazine-dextromethorphan 6.25-15 MG/5ML Oral Syrup Take 5 mL by mouth 4 (four) times daily as needed for cough. lamoTRIgine 200 MG Oral Tab TAKE 1 TABLET BY MOUTH IN THE MORNING AND TAKE 1 TABLET BY MOUTH AT DINNER    gabapentin 800 MG Oral Tab Take 1 tablet (800 mg total) by mouth in the morning and 1 tablet (800 mg total) in the evening and 1 tablet (800 mg total) before bedtime. Take with 100 mg for a total dose of 900 mg three times a day. .    gabapentin 100 MG Oral Cap TAKE 1 CAPSULE BY MOUTH THREE TIMES DAILY TAKE WITH 800 MG FOR A TOTAL DOSE  MG THREE TIMES DAILY FLUoxetine HCl 40 MG Oral Cap Take 1 capsule (40 mg total) by mouth in the morning. benztropine 0.5 MG Oral Tab TAKE 1 TABLET BY MOUTH TWICE DAILY    ARIPiprazole 10 MG Oral Tab Take 1 tablet (10 mg total) by mouth in the morning. busPIRone HCl 30 MG Oral Tab Take 1 tablet (30 mg total) by mouth in the morning and 1 tablet (30 mg total) in the evening and 1 tablet (30 mg total) before bedtime. ALPRAZolam ER 2 MG Oral Tablet 24 Hr TAKE 1 TABLET BY MOUTH EVERY MORNING    HYDROcodone-acetaminophen 5-325 MG Oral Tab () Take 1-2 tablets by mouth every 6 (six) hours as needed for Pain (do not take this medication prior to drinking alcohol or driving as this medication can impair your senses. ). ALPRAZolam 1 MG Oral Tab TAKE 1 TABLET BY MOUTH IN THE AFTERNOON AS NEEDED FOR ANXIETY    prazosin 1 MG Oral Cap Take 2 capsules at night      Diagnoses for This Visit    Memory deficit   [674529]             We Ordered the Following     Normal Orders This Visit    EEG [NEU4 CUSTOM]       Future Appointments        Provider Department    2023 11:00 AM [address] MR RM4 (3T WIDE) [facility] MRI                Did you know that [facility] primary care physicians now offer Video Visits through [address] for adult patients for a variety of conditions such as allergies, back pain and cold symptoms? Skip the drive and waiting room and online chat with a doctor face-to-face using your web-cam enabled computer or mobile device wherever you are. Video Visits cost $50 and can be paid hassle-free using a credit, debit, or health savings card. Not active on [employer]? Ask us how to get signed up today! If you receive a survey from [employer], please take a few minutes to complete it and provide feedback. We strive to deliver the best patient experience and are looking for ways to make improvements. Your feedback will help us do so.  For more information on [facility], please visit [email or web address]           No text in SmartText           No text in SmartText

## (undated) NOTE — LETTER
Date: 11/16/2022    Patient Name: Jane Berumen          To Whom it may concern: This letter has been written at the patient's request. The above patient was seen at the [facility] for treatment of a medical condition. This patient should be excused from attending work/school from 11/16/22 through 11/18/2022 and may return on 1121/2022 if fever free.       Sincerely,          Som Peguero, DO

## (undated) NOTE — Clinical Note
February 10, 2017    [facility]  [address] [place] [address]      Dear [facility]:    normal    The following are the results of your recent tests ordered by [facility]. Please review the list of test results.   Your result is th

## (undated) NOTE — LETTER
Date: 2/26/2024    Patient Name: Juliana Ness          To Whom it may concern:    The above is a patient of [facility]. Please excuse Ms. Ness from work for the dates of 2/26/24 & 2/27/24 for sure and until she is feeling better.          Sincerely,        Deanna Padron MD

## (undated) NOTE — LETTER
Date: 10/5/2022    Patient Name: Gerry Saucedo          To Whom it may concern: The above is a [facility]. She was seen in our office and is not to participate in contact sports or activities at high risk of a fall for 30 days after it occurred. She may gradually return to activities as she tolerates on 10/21/22.            Sincerely,        Roderick William MD

## (undated) NOTE — ED AVS SNAPSHOT
Maylin Diaz   MRN: [id number]    Department:  [facility] Emergency Department   Date of Visit:  11/11/2018           Disclosure     Insurance plans vary and the physician(s) referred by the ER may not be covered by your plan.  Please contact tell this physician (or your personal doctor if your instructions are to return to your personal doctor) about any new or lasting problems. The primary care or specialist physician will see patients referred from the [facility] Emergency Department.  Alexandr Araujo

## (undated) NOTE — LETTER
Date: 12/5/2022    Patient Name: Valeriano Camacho          To Whom it may concern: This letter has been written at the patient's request. The above patient was seen at the [facility] for treatment of a medical condition. This patient should be excused from attending work/school from 12/5/2022 through 12/7/2022. May extend days off if symptoms are not getting better. The patient may return to work/school on 12/8/2022 without limitations.         Sincerely,          ASHLEY Pepe

## (undated) NOTE — LETTER
Date: 10/26/2023    Patient Name: Conrado Urias          To Whom it may concern: The above is a patient of [facility]. Please excuse Chelsea Ervin from work due to illness for the dates of 10/26/23 and 10/27/23.           Sincerely,          Emily Blackwell MD

## (undated) NOTE — LETTER
January 8, 2024          Juliana Ness  [address] [place] [id number]           CERTIFIED            Dear Juliana:    Our records indicate that you have still not completed the following medical service(s) per your physician's request.      Medical Service(s) to be completed:  pulmonary function test    This is the final reminder that you will receive from us.  Please complete these recommended medical services.  Your health is important to us.  If you have completed this testing/service and received this letter in error, please disregard this letter with our apologies.  Thank you.          ASHLEY Interiano

## (undated) NOTE — ED AVS SNAPSHOT
Rigo Hughes   MRN: [id number]    Department:  THE [facility] Emergency Department in [place]   Date of Visit:  11/4/2018           Disclosure     Insurance plans vary and the physician(s) referred by the ER may not be covered by your plan.  Please cont tell this physician (or your personal doctor if your instructions are to return to your personal doctor) about any new or lasting problems. The primary care or specialist physician will see patients referred from the [facility] Emergency Department.  Stefanie Payne

## (undated) NOTE — LETTER
Date & Time: 2/10/2023, 10:52 AM  Patient: Devyn Allen      To Whom It May Concern:    Cristopher Leyden was seen and treated in our department on 2/10/2023. She can return to work when patient is feeling better. Patient is treated for illness and should remain off work when symptoms subside. .    If you have any questions or concerns, please do not hesitate to call.        _____________________________  Physician/APC Signature

## (undated) NOTE — LETTER
Date & Time: 9/22/2022, 12:24 PM  Patient: Caryle Arias  Encounter Provider(s):    Erica Lui MD       To Whom It May Concern:    Kayleen Jacobsen was seen and treated in our department on 9/22/2022. She should not return to work until 09/23/22.     If you have any questions or concerns, please do not hesitate to call.        _____________________________  Physician/APC Signature

## (undated) NOTE — LETTER
Date & Time: 12/8/2022, 7:41 PM  Patient: Cris Adkins  Encounter Provider(s):    Morales Camargo MD       To Whom It May Concern:    Erna Bella was seen and treated in our department on 12/8/2022. She should not return to work until 11/13/22.     If you have any questions or concerns, please do not hesitate to call.        _____________________________  Physician/APC Signature

## (undated) NOTE — LETTER
Date: 9/29/2022    Patient Name: Jorge Luis Ashby          To Whom it may concern: The above is a patient of [facility]. Betzaida Manny was seen in the office today. Please excuse her for the time she missed.           Sincerely,        Art Duncan MD

## (undated) NOTE — LETTER
Date & Time: 10/18/2023, 11:24 AM  Patient: Genny Franks      To Whom It May Concern:    Roberto Cali was seen and treated in our department on 10/18/2023. She can return to work on Monday 10/23/23.     If you have any questions or concerns, please do not hesitate to call.         _____________________________  Physician/APC Signature

## (undated) NOTE — LETTER
ASTHMA ACTION PLAN for Juliana Ness     : 1980     Date: 2025  Provider:  Deanna Padron MD  Phone for doctor or clinic: [facility], [address] [place] [address] [address]  [facility] [id number] [phone]    ACT Score: 23      You can use the colors of a traffic light to help learn about your asthma medicines.      1. Green - Go! % of Personal Best Peak Flow Use controller medicine.   Breathing is good  No cough or wheeze  Can work and play Medicine How much to take When to take it    Advair inhaler 230/21, 2 puffs twice a day  Singulair (Montelukast) 10 mg by mouth daily        2. Yellow - Caution. 50-79% Personal Best Peak  Flow.  Use reliever medicine to keep an asthma attack from getting bad.   Cough  Wheezing  Tight Chest  Wake up at night Medicine How much to take When to take it    Albuterol inhaler,  2 puffs every 4 (four) hours as needed.  Albuterol 2.5 mg nebules, use in nebulizer every 4 (four) hours as needed         Additional instructions         3. Red - Stop! Danger!  <50% Personal Best Peak  Flow. Take these medications until  Get help from a doctor   Medicine not helping  Breathing is hard and fast  Nose opens wide  Can't walk  Ribs show  Can't talk well Medicine How much to take When to take it    Albuterol Inhaler, 2 puffs every 20 minutes up to 3 times in a row for severe symptoms on the way to the Emergency Room.       Additional Instructions If your symptoms do not improve and you cannot contact your doctor[facility] or call 911 immediately!     [x] Asthma Action Plan reviewed with patient (and caregiver if necessary) and a copy of the plan was given to the patient/caregiver.   [] Asthma Action Plan reviewed with patient (and caregiver if necessary) on the phone and mailed copy to patient or submitted via [employer].     Signatures:  Provider  Deanna Padron MD   Patient Caretaker

## (undated) NOTE — LETTER
Date: 12/8/2022    Patient Name: Perlita Mason          To Whom it may concern: This letter has been written at the patient's request. The above patient was seen at the [facility] for treatment of a medical condition. This patient should be excused from attending work 11/16/22 - 12/12/22.          Sincerely,          Mauri Ramirez MD

## (undated) NOTE — LETTER
Date: 2/6/2023    Patient Name: Cris Adkins          To Whom it may concern: The above is a patient of [facility]. She was seen today, please excuse her for the dates of 2/3/-2/7/23. She may return when she is feeling better.         Sincerely,          Angel Lala MD